# Patient Record
Sex: MALE | Race: WHITE | HISPANIC OR LATINO | Employment: UNEMPLOYED | URBAN - METROPOLITAN AREA
[De-identification: names, ages, dates, MRNs, and addresses within clinical notes are randomized per-mention and may not be internally consistent; named-entity substitution may affect disease eponyms.]

---
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
## 2021-02-03 ENCOUNTER — TELEPHONE (OUTPATIENT)
Dept: PEDIATRICS CLINIC | Facility: CLINIC | Age: 21
End: 2021-02-03

## 2021-02-05 NOTE — TELEPHONE ENCOUNTER
02/04/21 7:31 PM     Thank you for your request  Your request has been received, reviewed, and the patient chart updated  The PCP has successfully been removed with a patient attribution note  This message will now be completed      Thank you  Melissa Reese

## 2021-07-30 ENCOUNTER — OFFICE VISIT (OUTPATIENT)
Dept: INTERNAL MEDICINE CLINIC | Facility: CLINIC | Age: 21
End: 2021-07-30
Payer: COMMERCIAL

## 2021-07-30 VITALS
HEART RATE: 86 BPM | TEMPERATURE: 98.4 F | SYSTOLIC BLOOD PRESSURE: 124 MMHG | DIASTOLIC BLOOD PRESSURE: 90 MMHG | OXYGEN SATURATION: 98 % | WEIGHT: 250.6 LBS | BODY MASS INDEX: 37.98 KG/M2 | HEIGHT: 68 IN

## 2021-07-30 DIAGNOSIS — Z13.1 SCREENING FOR DIABETES MELLITUS: ICD-10-CM

## 2021-07-30 DIAGNOSIS — F34.1 DYSTHYMIA: Primary | ICD-10-CM

## 2021-07-30 DIAGNOSIS — Z13.220 SCREENING FOR LIPID DISORDERS: ICD-10-CM

## 2021-07-30 DIAGNOSIS — Z13.0 SCREENING FOR DEFICIENCY ANEMIA: ICD-10-CM

## 2021-07-30 DIAGNOSIS — F41.9 ANXIETY: ICD-10-CM

## 2021-07-30 DIAGNOSIS — Z11.59 NEED FOR HEPATITIS C SCREENING TEST: ICD-10-CM

## 2021-07-30 DIAGNOSIS — Z11.4 SCREENING FOR HIV (HUMAN IMMUNODEFICIENCY VIRUS): ICD-10-CM

## 2021-07-30 DIAGNOSIS — Z13.29 SCREENING FOR THYROID DISORDER: ICD-10-CM

## 2021-07-30 DIAGNOSIS — Z76.89 ENCOUNTER TO ESTABLISH CARE: ICD-10-CM

## 2021-07-30 PROBLEM — R14.0 BLOATING: Status: RESOLVED | Noted: 2018-04-27 | Resolved: 2021-07-30

## 2021-07-30 PROBLEM — R19.7 DIARRHEA: Status: RESOLVED | Noted: 2017-12-13 | Resolved: 2021-07-30

## 2021-07-30 PROBLEM — S62.316D CLOSED DISPLACED FRACTURE OF BASE OF FIFTH METACARPAL BONE OF RIGHT HAND WITH ROUTINE HEALING: Status: RESOLVED | Noted: 2018-09-19 | Resolved: 2021-07-30

## 2021-07-30 PROBLEM — R10.84 GENERALIZED ABDOMINAL PAIN: Status: RESOLVED | Noted: 2018-04-27 | Resolved: 2021-07-30

## 2021-07-30 PROCEDURE — 3008F BODY MASS INDEX DOCD: CPT | Performed by: NURSE PRACTITIONER

## 2021-07-30 PROCEDURE — 99214 OFFICE O/P EST MOD 30 MIN: CPT | Performed by: NURSE PRACTITIONER

## 2021-07-30 PROCEDURE — 3725F SCREEN DEPRESSION PERFORMED: CPT | Performed by: NURSE PRACTITIONER

## 2021-07-30 RX ORDER — HYDROXYZINE PAMOATE 25 MG/1
25 CAPSULE ORAL 3 TIMES DAILY PRN
Qty: 30 CAPSULE | Refills: 0 | Status: SHIPPED | OUTPATIENT
Start: 2021-07-30

## 2021-07-30 NOTE — PROGRESS NOTES
Assessment/Plan:    Problem List Items Addressed This Visit        Other    Dysthymia - Primary     Discussed options for treatment  Patient would like to start talk therapy and hold off on daily medication  Referral given for psychology  He is open to trying hydroxyzine prn for anxiety   Close follow up in 1 month for re evaluation  Discussed focusing on himself and being the best version of himself, setting goals, getting outside, staying active and exercising, healthy diet, compliance with going to work, etc    No SI or HI         Relevant Medications    hydrOXYzine pamoate (VISTARIL) 25 mg capsule    Other Relevant Orders    Ambulatory referral to Psychology    Anxiety     Start hydroxyzine prn  Follow up with talk therapy          Relevant Medications    hydrOXYzine pamoate (VISTARIL) 25 mg capsule    Other Relevant Orders    TSH, 3rd generation with Free T4 reflex      Other Visit Diagnoses     Screening for lipid disorders        Relevant Orders    Lipid Panel with Direct LDL reflex    Screening for thyroid disorder        Relevant Orders    TSH, 3rd generation with Free T4 reflex    Screening for deficiency anemia        Relevant Orders    CBC    Screening for diabetes mellitus        Relevant Orders    Comprehensive metabolic panel    Screening for HIV (human immunodeficiency virus)        Relevant Orders    HIV 1/2 Antigen/Antibody (4th Generation) w Reflex [facility]    Need for hepatitis C screening test        Relevant Orders    Hepatitis C antibody    Encounter to establish care            Depression Screening Follow-up Plan: Patient's depression screening was positive with a PHQ-2 score of 3  Their PHQ-9 score was 11  Patient assessed for underlying major depression  They have no active suicidal ideations  Brief counseling provided and recommend additional follow-up/re-evaluation next office visit  Referral given to psychology for talk therapy     M*Modal software was used to dictate this note    It may contain errors with dictating incorrect words or incorrect spelling  Please contact the provider directly with any questions  Subjective:      Patient ID: Beau Dotson is a 21 y o  male  HPI     Patient presents today to establish care with our office  He was not previously following with another PCP  He has concerns today for depression  PMH includes asthma  He is currently on albuterol prn  He has seasonal allergies for which he uses zyrtec  He is a current smoker  He uses a nicotine vape about once a week  He presents today with depression over the last month  He recently ended a relationship of 4 years  He states it was going downhill for a while and that is when he started with some depression, it worsened after the break up  Symptoms include feeling unmotivated, social anxiety  Anxiety when waking up in morning  He works in a warehouse on the weekends  He called off the last 2 weekends due to his anxiety  He lost his car in the relationship  He states he bought the car but put it in her name and now he cant have it transferred to his name because he doesn't have any credit  Felt like his life was together but now feels like it fell apart    No SI or HI  He has a good support system from his mother and also his friends/cousins       PHQ-9 Depression Screening    PHQ-9:   Frequency of the following problems over the past two weeks:      Little interest or pleasure in doing things: 2 - more than half the days  Feeling down, depressed, or hopeless: 1 - several days  Trouble falling or staying asleep, or sleeping too much: 2 - more than half the days  Feeling tired or having little energy: 1 - several days  Poor appetite or overeating: 3 - nearly every day  Feeling bad about yourself - or that you are a failure or have let yourself or your family down: 1 - several days  Trouble concentrating on things, such as reading the newspaper or watching television: 0 - not at all  Moving or speaking so slowly that other people could have noticed  Or the opposite - being so fidgety or restless that you have been moving around a lot more than usual: 1 - several days  Thoughts that you would be better off dead, or of hurting yourself in some way: 0 - not at all  PHQ-2 Score: 3  PHQ-9 Score: 11           The following portions of the patient's history were reviewed and updated as appropriate: allergies, current medications, past family history, past medical history, past social history, past surgical history and problem list     Review of Systems   Psychiatric/Behavioral: Positive for dysphoric mood  Negative for self-injury and suicidal ideas  The patient is nervous/anxious            Past Medical History:   Diagnosis Date    Asthma     Closed displaced fracture of base of fifth metacarpal bone of right hand with routine healing 9/19/2018    Otitis media     Shoulder pain     left    Sinus infection          Current Outpatient Medications:     albuterol (PROVENTIL HFA,VENTOLIN HFA) 90 mcg/act inhaler, Inhale 2 puffs every 6 (six) hours as needed for wheezing, Disp: , Rfl:     cetirizine (ZyrTEC) 10 mg tablet, Take 1 tablet (10 mg total) by mouth daily, Disp: 30 tablet, Rfl: 1    famotidine (PEPCID) 20 mg tablet, TAKE 1 TABLET BY MOUTH TWICE A DAY, Disp: 30 tablet, Rfl: 3    hydrOXYzine pamoate (VISTARIL) 25 mg capsule, Take 1 capsule (25 mg total) by mouth 3 (three) times a day as needed for anxiety, Disp: 30 capsule, Rfl: 0    No Known Allergies    Social History   Past Surgical History:   Procedure Laterality Date    CIRCUMCISION      [facility]    Procedure: ARTHROSCOPY SHOULDER , ANTERIOR LABRAL REPAIR ;  Surgeon: Eva Duffy MD;  Location: [facility] OR;  Service: Orthopedics     Family History   Problem Relation Age of Onset    Colon polyps Mother     Hypothyroidism Mother     ADD / ADHD Sister     Anemia Maternal Grandmother     Asthma Maternal Grandmother     Hypothyroidism Maternal Grandmother     Cirrhosis Maternal Grandfather     Hepatitis Maternal Grandfather     Liver disease Maternal Grandfather     Breast cancer Paternal Grandmother     Inflammatory bowel disease Maternal Aunt     ADD / ADHD Maternal Uncle     Asthma Maternal Uncle     Hypertension Maternal Uncle     No Known Problems Father        Objective:  /90 (BP Location: Left arm, Patient Position: Sitting, Cuff Size: Large)   Pulse 86   Temp 98 4 °F (36 9 °C) (Temporal)   Ht 5' 8" (1 727 m)   Wt 114 kg (250 lb 9 6 oz)   SpO2 98%   BMI 38 10 kg/m²      Physical Exam  Vitals reviewed  Constitutional:       General: He is not in acute distress  Appearance: Normal appearance  He is well-developed  He is obese  He is not diaphoretic  HENT:      Head: Normocephalic and atraumatic  Right Ear: Tympanic membrane and external ear normal       Left Ear: Tympanic membrane and external ear normal       Nose: Nose normal       Mouth/Throat:      Mouth: Mucous membranes are moist       Pharynx: Oropharynx is clear  No oropharyngeal exudate or posterior oropharyngeal erythema  Eyes:      Extraocular Movements: Extraocular movements intact  Conjunctiva/sclera: Conjunctivae normal       Pupils: Pupils are equal, round, and reactive to light  Neck:      Vascular: No carotid bruit  Cardiovascular:      Rate and Rhythm: Normal rate and regular rhythm  Heart sounds: Normal heart sounds  No murmur heard  Pulmonary:      Effort: Pulmonary effort is normal  No respiratory distress  Breath sounds: Normal breath sounds  No decreased breath sounds, wheezing, rhonchi or rales  Musculoskeletal:      Cervical back: Neck supple  Lymphadenopathy:      Cervical: No cervical adenopathy  Skin:     General: Skin is warm and dry  Neurological:      Mental Status: He is alert and oriented to person, place, and time  Mental status is at baseline  Psychiatric:         Mood and Affect: Mood normal          Behavior: Behavior normal

## 2021-07-30 NOTE — ASSESSMENT & PLAN NOTE
Discussed options for treatment  Patient would like to start talk therapy and hold off on daily medication  Referral given for psychology  He is open to trying hydroxyzine prn for anxiety   Close follow up in 1 month for re evaluation  Discussed focusing on himself and being the best version of himself, setting goals, getting outside, staying active and exercising, healthy diet, compliance with going to work, etc    No SI or HI

## 2021-08-26 ENCOUNTER — RA CDI HCC (OUTPATIENT)
Dept: OTHER | Facility: HOSPITAL | Age: 21
End: 2021-08-26

## 2021-08-26 NOTE — PROGRESS NOTES
[facility]  coding opportunities       Chart reviewed, no opportunity found: CHART REVIEWED, NO OPPORTUNITY FOUND                        Patients insurance company: [employer] (Medicare Advantage and Commercial)

## 2022-01-31 ENCOUNTER — TELEMEDICINE (OUTPATIENT)
Dept: INTERNAL MEDICINE CLINIC | Facility: OTHER | Age: 22
End: 2022-01-31
Payer: COMMERCIAL

## 2022-01-31 ENCOUNTER — TELEPHONE (OUTPATIENT)
Dept: OTHER | Facility: OTHER | Age: 22
End: 2022-01-31

## 2022-01-31 VITALS — HEIGHT: 68 IN | BODY MASS INDEX: 38.09 KG/M2 | WEIGHT: 251.32 LBS

## 2022-01-31 DIAGNOSIS — B34.9 VIRAL INFECTION: Primary | ICD-10-CM

## 2022-01-31 DIAGNOSIS — Z20.828 EXPOSURE TO SARS-ASSOCIATED CORONAVIRUS: Primary | ICD-10-CM

## 2022-01-31 PROCEDURE — U0003 INFECTIOUS AGENT DETECTION BY NUCLEIC ACID (DNA OR RNA); SEVERE ACUTE RESPIRATORY SYNDROME CORONAVIRUS 2 (SARS-COV-2) (CORONAVIRUS DISEASE [COVID-19]), AMPLIFIED PROBE TECHNIQUE, MAKING USE OF HIGH THROUGHPUT TECHNOLOGIES AS DESCRIBED BY CMS-2020-01-R: HCPCS | Performed by: NURSE PRACTITIONER

## 2022-01-31 PROCEDURE — 99213 OFFICE O/P EST LOW 20 MIN: CPT | Performed by: NURSE PRACTITIONER

## 2022-01-31 PROCEDURE — U0005 INFEC AGEN DETEC AMPLI PROBE: HCPCS | Performed by: NURSE PRACTITIONER

## 2022-01-31 NOTE — TELEPHONE ENCOUNTER
Pt calling because he was seen via telemedicine today for Covid symptoms  Provider said she would place the order however, he is at the testing site, and there is not an order in the system  Reviewed provider's notes and placed Covid order  Pt also said provider told him that she would place a work note into his AVS that he can view in [address]  Pt says that he can not see it on MyChart  Informed him that I will let office staff know

## 2022-01-31 NOTE — PATIENT INSTRUCTIONS
Quarantine x 5 days from symptom onset  Take tylenol or ibuprofen as needed for body aches/headaches  Use mucinex bid for congestion/cough  Drink plenty of fluids  Obtain flu/covid testing

## 2022-01-31 NOTE — LETTER
[facility] [address] [address]  Dept: [phone]    January 31, 2022    Patient: Tsering Hernandes  YOB: 2000    Tsering Hernandes was seen and evaluated at our [facility]  Please note if Covid and Flu tests are negative, they may return to work when fever free for 24 hours without the use of a fever reducing agent  If Covid or Flu test is positive, they may return to work on 2/2/2022, as this is 5 days from the onset of symptoms  Upon return, they must then adhere to strict masking for an additional 5 days      Sincerely,    Melinda Moreno

## 2022-01-31 NOTE — PROGRESS NOTES
COVID-19 Outpatient Progress Note    Assessment/Plan:    Problem List Items Addressed This Visit        Other    Viral infection - Primary         Disposition:     Referred patient to centralized site to test for COVID-19/Influenza  Patient is fully vaccinated and I recommended self quarantine for 5 days followed by strict mask use for an additional 5 days  If patient were to develop symptoms, they should immediately self isolate and call our office for further guidance  Discussed symptom directed medication options with patient  Drink plenty of fluids  Take tylenol or ibuprofen as needed for body aches  Quarantine x 5 days from onset of symptoms then mask x 5 days  Obtain covid/flu test  Take mucinex as needed for cough  Take flonase for nasal congestion    I have spent 20 minutes directly with the patient  Greater than 50% of this time was spent in counseling/coordination of care regarding: risks and benefits of treatment options, instructions for management, patient and family education and importance of treatment compliance  Encounter provider FREDDY Knutson    Provider located at [address] [place] [address]    Recent Visits  No visits were found meeting these conditions  Showing recent visits within past 7 days and meeting all other requirements  Today's Visits  Date Type Provider Dept   01/31/22 Telemedicine FREDDY Knutson [facility]   Showing today's visits and meeting all other requirements  Future Appointments  No visits were found meeting these conditions  Showing future appointments within next 150 days and meeting all other requirements     This virtual check-in was done via [employer] and patient was informed that this is a secure, HIPAA-compliant platform  He agrees to proceed      Patient agrees to participate in a virtual check in via telephone or video visit instead of presenting to the office to address urgent/immediate medical needs  Patient is aware this is a billable service  After connecting through [facility], the patient was identified by name and date of birth  Adria Kline was informed that this was a telemedicine visit and that the exam was being conducted confidentially over secure lines  My office door was closed  No one else was in the room  82 Isabel Kline acknowledged consent and understanding of privacy and security of the telemedicine visit  I informed the patient that I have reviewed his record in Epic and presented the opportunity for him to ask any questions regarding the visit today  The patient agreed to participate  Verification of patient location:  Patient is located in the following state in which I hold an active license: [place]    Subjective:   Adria Kline is a 24 y o  male who is concerned about COVID-19  Patient's symptoms include nasal congestion, sore throat, anosmia, loss of taste and cough  - Date of symptom onset: 1/28/2022      COVID-19 vaccination status: Fully vaccinated (primary series)    Exposure:   Contact with a person who is under investigation (PUI) for or who is positive for COVID-19 within the last 14 days?: No    Hospitalized recently for fever and/or lower respiratory symptoms?: No      Currently a healthcare worker that is involved in direct patient care?: No      Works in a special setting where the risk of COVID-19 transmission may be high? (this may include long-term care, correctional and group home facilities; homeless shelters; assisted-living facilities and group homes ): Yes      Resident in a special setting where the risk of COVID-19 transmission may be high? (this may include long-term care, correctional and group home facilities; homeless shelters; assisted-living facilities and group homes ): No      Pt works at [employer]   He started to develop vague symptoms on Friday of nasal congestion, headache, cough  He denies any fever or chills, he does have loss of taste/smell this a m  He is requesting testing due to his occupation  I did tell him to quarantine x 5 days from onset of symptoms as well as make sure he masks 5 days thereafter  He will obtain testing, He can also take mucinex bid as needed for cough; tylenol and ibuprofen as needed for generalized body aches  Lab Results   Component Value Date    [address] [facility] [address] [facility] Not Detected 12/13/2021     Past Medical History:   Diagnosis Date    Asthma     Closed displaced fracture of base of fifth metacarpal bone of right hand with routine healing 9/19/2018    Otitis media     Shoulder pain     left    Sinus infection      Past Surgical History:   Procedure Laterality Date    CIRCUMCISION      [facility] ARTHROSCOP,SURG,CAPSULORRHAPHY Left 5/9/2016    Procedure: ARTHROSCOPY SHOULDER , ANTERIOR LABRAL REPAIR ;  Surgeon: Сергей Rubio MD;  Location: [facility] OR;  Service: Orthopedics     Current Outpatient Medications   Medication Sig Dispense Refill    albuterol (PROVENTIL HFA,VENTOLIN HFA) 90 mcg/act inhaler Inhale 2 puffs every 6 (six) hours as needed for wheezing      cetirizine (ZyrTEC) 10 mg tablet Take 1 tablet (10 mg total) by mouth daily 30 tablet 1    famotidine (PEPCID) 20 mg tablet TAKE 1 TABLET BY MOUTH TWICE A DAY 30 tablet 3    hydrOXYzine pamoate (VISTARIL) 25 mg capsule Take 1 capsule (25 mg total) by mouth 3 (three) times a day as needed for anxiety 30 capsule 0     No current facility-administered medications for this visit  No Known Allergies    Review of Systems   HENT: Positive for congestion and sore throat  Respiratory: Positive for cough  Objective:    Vitals:    01/31/22 1153   Weight: 114 kg (251 lb 5 2 oz)   Height: 5' 8" (1 727 m)       Physical Exam  Vitals reviewed  Constitutional:       Appearance: Normal appearance  He is obese     Neurological: Mental Status: He is alert  VIRTUAL VISIT Katie Duran verbally agrees to participate in [employer]  Pt is aware that [employer] could be limited without vital signs or the ability to perform a full hands-on physical exam  Jovi Flowers understands he or the provider may request at any time to terminate the video visit and request the patient to seek care or treatment in person

## 2022-03-30 ENCOUNTER — OFFICE VISIT (OUTPATIENT)
Dept: INTERNAL MEDICINE CLINIC | Facility: OTHER | Age: 22
End: 2022-03-30
Payer: COMMERCIAL

## 2022-03-30 VITALS
HEIGHT: 68 IN | SYSTOLIC BLOOD PRESSURE: 136 MMHG | TEMPERATURE: 98 F | OXYGEN SATURATION: 99 % | DIASTOLIC BLOOD PRESSURE: 80 MMHG | WEIGHT: 256 LBS | BODY MASS INDEX: 38.8 KG/M2 | HEART RATE: 86 BPM

## 2022-03-30 DIAGNOSIS — J45.909 UNCOMPLICATED ASTHMA, UNSPECIFIED ASTHMA SEVERITY, UNSPECIFIED WHETHER PERSISTENT: ICD-10-CM

## 2022-03-30 DIAGNOSIS — J11.1 INFLUENZA: Primary | ICD-10-CM

## 2022-03-30 PROCEDURE — 99213 OFFICE O/P EST LOW 20 MIN: CPT | Performed by: NURSE PRACTITIONER

## 2022-03-30 PROCEDURE — 3008F BODY MASS INDEX DOCD: CPT | Performed by: NURSE PRACTITIONER

## 2022-03-30 PROCEDURE — 3725F SCREEN DEPRESSION PERFORMED: CPT | Performed by: NURSE PRACTITIONER

## 2022-03-30 RX ORDER — OSELTAMIVIR PHOSPHATE 75 MG/1
75 CAPSULE ORAL EVERY 12 HOURS SCHEDULED
Qty: 10 CAPSULE | Refills: 0 | Status: SHIPPED | OUTPATIENT
Start: 2022-03-30 | End: 2022-04-04

## 2022-03-30 NOTE — ASSESSMENT & PLAN NOTE
Due to known exposure to influenza will start Tamiflu advised patient to be out of work for 72 hours and not return if he develops a fever  Patient would like to defer COVID testing at this time  Illness appears to be viral in nature  Rest and fluids advised  Educated that the course of this illness could be 2-4 weeks  Discussed symptomatic relief, such as warm steam inhalations, tylenol/ibuprofen for fevers and body aches, rest, and drink plenty of fluids  Warm salt gargles for sore throat  Discussed red flag signs to go to the ER, such as chest pain or shortness of breath     Return to the office for reevaluation if symptoms worsen or do not improve in 1-2 weeks

## 2022-03-30 NOTE — LETTER
March 30, 2022     Patient: Waldemar Mcduffie   YOB: 2000   Date of Visit: 3/30/2022       To Whom it May Concern:    Ernst Dasilva is under my professional care  He was seen in my office on 3/30/2022  He may return to work on 4/1/22  If you have any questions or concerns, please don't hesitate to call  Sincerely,          [address], [place]        CC: Jovi Brown Erp

## 2022-03-30 NOTE — PROGRESS NOTES
Assessment/Plan:    Influenza  Due to known exposure to influenza will start Tamiflu advised patient to be out of work for 72 hours and not return if he develops a fever  Patient would like to defer COVID testing at this time  Illness appears to be viral in nature  Rest and fluids advised  Educated that the course of this illness could be 2-4 weeks  Discussed symptomatic relief, such as warm steam inhalations, tylenol/ibuprofen for fevers and body aches, rest, and drink plenty of fluids  Warm salt gargles for sore throat  Discussed red flag signs to go to the ER, such as chest pain or shortness of breath  Return to the office for reevaluation if symptoms worsen or do not improve in 1-2 weeks          Diagnoses and all orders for this visit:    Influenza  -     oseltamivir (TAMIFLU) 75 mg capsule; Take 1 capsule (75 mg total) by mouth every 12 (twelve) hours for 5 days    Uncomplicated asthma, unspecified asthma severity, unspecified whether persistent          Subjective:      Patient ID: Tsering Hernandes is a 24 y o  male  Patient presents today with URI like symptoms  Reports that his mom who he lives with was diagnosed with the flu, no known exposure to [place]  Patient reports he is vaccinated for both influenza a and COVID-19  Sore Throat   This is a new problem  The current episode started yesterday  The problem has been gradually worsening  Neither side of throat is experiencing more pain than the other  There has been no fever  The pain is at a severity of 6/10  Associated symptoms include congestion and trouble swallowing  Pertinent negatives include no abdominal pain, coughing, diarrhea, ear discharge, ear pain, headaches, neck pain, shortness of breath or vomiting  Treatments tried: throat spray  The treatment provided mild relief         The following portions of the patient's history were reviewed and updated as appropriate: allergies, current medications, past family history, past medical history, past social history, past surgical history and problem list     Review of Systems   Constitutional: Negative for activity change, appetite change, chills, diaphoresis and fever  HENT: Positive for congestion, sore throat and trouble swallowing  Negative for ear discharge, ear pain, postnasal drip, rhinorrhea, sinus pressure and sinus pain  Eyes: Negative for pain, discharge, itching and visual disturbance  Respiratory: Negative for cough, chest tightness, shortness of breath and wheezing  Cardiovascular: Negative for chest pain, palpitations and leg swelling  Gastrointestinal: Negative for abdominal pain, constipation, diarrhea, nausea and vomiting  Endocrine: Negative for polydipsia, polyphagia and polyuria  Genitourinary: Negative for difficulty urinating, dysuria and urgency  Musculoskeletal: Negative for arthralgias, back pain and neck pain  Skin: Negative for rash and wound  Neurological: Negative for dizziness, weakness, numbness and headaches  Objective:      /80 (BP Location: Left arm, Patient Position: Sitting, Cuff Size: Large)   Pulse 86   Temp 98 °F (36 7 °C)   Ht 5' 8" (1 727 m)   Wt 116 kg (256 lb)   SpO2 99%   BMI 38 92 kg/m²          Physical Exam  Constitutional:       General: He is not in acute distress  Appearance: He is well-developed  He is not diaphoretic  HENT:      Head: Normocephalic and atraumatic  Right Ear: External ear normal       Left Ear: External ear normal       Nose: Nose normal       Mouth/Throat:      Pharynx: No oropharyngeal exudate  Eyes:      General:         Right eye: No discharge  Left eye: No discharge  Conjunctiva/sclera: Conjunctivae normal       Pupils: Pupils are equal, round, and reactive to light  Neck:      Thyroid: No thyromegaly  Cardiovascular:      Rate and Rhythm: Normal rate and regular rhythm  Heart sounds: Normal heart sounds  No murmur heard    No friction rub  No gallop  Pulmonary:      Effort: Pulmonary effort is normal  No respiratory distress  Breath sounds: Normal breath sounds  No stridor  No wheezing or rales  Abdominal:      General: Bowel sounds are normal  There is no distension  Palpations: Abdomen is soft  Tenderness: There is no abdominal tenderness  Musculoskeletal:      Cervical back: Normal range of motion and neck supple  Lymphadenopathy:      Head:      Right side of head: Submandibular adenopathy present  Left side of head: Submandibular adenopathy present  Cervical: No cervical adenopathy  Skin:     General: Skin is warm and dry  Findings: No erythema or rash  Neurological:      Mental Status: He is alert and oriented to person, place, and time  Psychiatric:         Behavior: Behavior normal          Thought Content:  Thought content normal          Judgment: Judgment normal

## 2022-10-11 PROBLEM — J11.1 INFLUENZA: Status: RESOLVED | Noted: 2022-03-30 | Resolved: 2022-10-11

## 2023-05-02 ENCOUNTER — TELEMEDICINE (OUTPATIENT)
Dept: INTERNAL MEDICINE CLINIC | Facility: CLINIC | Age: 23
End: 2023-05-02

## 2023-05-02 VITALS — BODY MASS INDEX: 36.37 KG/M2 | WEIGHT: 240 LBS | HEIGHT: 68 IN

## 2023-05-02 DIAGNOSIS — R11.2 NAUSEA AND VOMITING, UNSPECIFIED VOMITING TYPE: Primary | ICD-10-CM

## 2023-05-02 DIAGNOSIS — K21.9 GASTROESOPHAGEAL REFLUX DISEASE, UNSPECIFIED WHETHER ESOPHAGITIS PRESENT: ICD-10-CM

## 2023-05-02 RX ORDER — PANTOPRAZOLE SODIUM 40 MG/1
40 TABLET, DELAYED RELEASE ORAL DAILY
Qty: 14 TABLET | Refills: 0 | Status: SHIPPED | OUTPATIENT
Start: 2023-05-02

## 2023-05-02 RX ORDER — ONDANSETRON 4 MG/1
4 TABLET, FILM COATED ORAL EVERY 8 HOURS PRN
Qty: 20 TABLET | Refills: 0 | Status: SHIPPED | OUTPATIENT
Start: 2023-05-02

## 2023-05-02 NOTE — PROGRESS NOTES
Virtual Regular Visit    Verification of patient location:    Patient is located at Home in the following state in which I hold an active license [place]      Assessment/Plan:    Problem List Items Addressed This Visit    None  Visit Diagnoses     Nausea and vomiting, unspecified vomiting type    -  Primary    Relevant Medications    ondansetron (ZOFRAN) 4 mg tablet    Gastroesophageal reflux disease, unspecified whether esophagitis present        Relevant Medications    pantoprazole (PROTONIX) 40 mg tablet        Unclear etiology of the given symptoms, will give symptomatic treatment, expect symptoms to self resolve, if not recommend patient to follow-up           Reason for visit is   Chief Complaint   Patient presents with    Virtual Regular Visit     throwing up, irritated throat and upset stomach        Encounter provider Ange Cassidy MD    Provider located at [address] [facility] [address] [phone]      Recent Visits  No visits were found meeting these conditions  Showing recent visits within past 7 days and meeting all other requirements  Today's Visits  Date Type Provider Dept   05/02/23 Telemedicine Ange Cassidy MD [facility]  [address] Internal [facility]   Showing today's visits and meeting all other requirements  Future Appointments  No visits were found meeting these conditions  Showing future appointments within next 150 days and meeting all other requirements       The patient was identified by name and date of birth  [birth year] [address] Guillen Raisa was informed that this is a telemedicine visit and that the visit is being conducted through the [employer]  He agrees to proceed     My office door was closed  No one else was in the room  He acknowledged consent and understanding of privacy and security of the video platform   The patient has agreed to participate and understands they can discontinue the visit at any time     Patient is aware this is a billable service  Subjective  Jovi Soriano is a 25 y o  male    Complains of upper abdominal pain, nausea and few episodes of vomiting since last night after eating a burger for lunch  Denies any fever chills shortness of breath diarrhea or chest pain       Past Medical History:   Diagnosis Date    Asthma     Closed displaced fracture of base of fifth metacarpal bone of right hand with routine healing 9/19/2018    Otitis media     Shoulder pain     left    Sinus infection        Past Surgical History:   Procedure Laterality Date    CIRCUMCISION      [place] SURGICAL ARTHROSCOPY SHOULDER CAPSULORRHAPHY Left 5/9/2016    Procedure: ARTHROSCOPY SHOULDER , ANTERIOR LABRAL REPAIR ;  Surgeon: Katherine Mi MD;  Location: [facility];  Service: Orthopedics       Current Outpatient Medications   Medication Sig Dispense Refill    albuterol (PROVENTIL HFA,VENTOLIN HFA) 90 mcg/act inhaler Inhale 2 puffs every 6 (six) hours as needed for wheezing      cetirizine (ZyrTEC) 10 mg tablet Take 1 tablet (10 mg total) by mouth daily 30 tablet 1    famotidine (PEPCID) 20 mg tablet TAKE 1 TABLET BY MOUTH TWICE A DAY 30 tablet 3    hydrOXYzine pamoate (VISTARIL) 25 mg capsule Take 1 capsule (25 mg total) by mouth 3 (three) times a day as needed for anxiety 30 capsule 0    ondansetron (ZOFRAN) 4 mg tablet Take 1 tablet (4 mg total) by mouth every 8 (eight) hours as needed for nausea or vomiting 20 tablet 0    pantoprazole (PROTONIX) 40 mg tablet Take 1 tablet (40 mg total) by mouth daily 14 tablet 0     No current facility-administered medications for this visit  No Known Allergies    Review of Systems   Constitutional: Negative for activity change, appetite change, chills, diaphoresis, fatigue, fever and unexpected weight change  HENT: Positive for congestion  Negative for sore throat      Respiratory: Negative for apnea, cough, choking, chest tightness, shortness of "breath, wheezing and stridor  Cardiovascular: Negative for chest pain, palpitations and leg swelling  Gastrointestinal: Positive for abdominal pain, nausea and vomiting  Negative for abdominal distention, blood in stool, constipation and rectal pain  Genitourinary: Negative for dysuria, flank pain, frequency and urgency  Musculoskeletal: Negative for arthralgias, back pain, gait problem, joint swelling and myalgias  Skin: Negative for color change, pallor and rash  Neurological: Negative for headaches  Video Exam    Vitals:    05/02/23 1413   Weight: 109 kg (240 lb)   Height: 5' 8\" (1 727 m)       Physical Exam  Constitutional:       General: He is not in acute distress  Appearance: Normal appearance  He is obese  He is not ill-appearing, toxic-appearing or diaphoretic  Neurological:      Mental Status: He is alert and oriented to person, place, and time            Visit Time  Total Visit Duration: 15      "

## 2023-05-30 ENCOUNTER — RA CDI HCC (OUTPATIENT)
Dept: OTHER | Facility: HOSPITAL | Age: 23
End: 2023-05-30

## 2023-05-30 NOTE — PROGRESS NOTES
[id number]    NOT on the BPA- please assess using MEAT for 2023 billing    [facility] [address]  coding opportunities          Chart Reviewed number of suggestions sent to Provider: 1     Patients Insurance        Commercial Insurance: [employer]

## 2023-06-07 ENCOUNTER — APPOINTMENT (OUTPATIENT)
Dept: LAB | Age: 23
End: 2023-06-07
Payer: COMMERCIAL

## 2023-06-07 ENCOUNTER — OFFICE VISIT (OUTPATIENT)
Dept: INTERNAL MEDICINE CLINIC | Facility: OTHER | Age: 23
End: 2023-06-07
Payer: COMMERCIAL

## 2023-06-07 VITALS
WEIGHT: 232 LBS | BODY MASS INDEX: 35.16 KG/M2 | DIASTOLIC BLOOD PRESSURE: 70 MMHG | TEMPERATURE: 98.6 F | OXYGEN SATURATION: 97 % | HEIGHT: 68 IN | HEART RATE: 82 BPM | SYSTOLIC BLOOD PRESSURE: 122 MMHG

## 2023-06-07 DIAGNOSIS — Z13.228 SCREENING FOR METABOLIC DISORDER: ICD-10-CM

## 2023-06-07 DIAGNOSIS — N48.9 LESION OF PENIS: Primary | ICD-10-CM

## 2023-06-07 DIAGNOSIS — N48.9 LESION OF PENIS: ICD-10-CM

## 2023-06-07 LAB
ALBUMIN SERPL BCP-MCNC: 4 G/DL (ref 3.5–5)
ALP SERPL-CCNC: 101 U/L (ref 46–116)
ALT SERPL W P-5'-P-CCNC: 55 U/L (ref 12–78)
AMORPH URATE CRY URNS QL MICRO: ABNORMAL
ANION GAP SERPL CALCULATED.3IONS-SCNC: 4 MMOL/L (ref 4–13)
AST SERPL W P-5'-P-CCNC: 24 U/L (ref 5–45)
BACTERIA UR QL AUTO: ABNORMAL /HPF
BASOPHILS # BLD AUTO: 0.07 THOUSANDS/ÂΜL (ref 0–0.1)
BASOPHILS NFR BLD AUTO: 1 % (ref 0–1)
BILIRUB SERPL-MCNC: 0.23 MG/DL (ref 0.2–1)
BILIRUB UR QL STRIP: NEGATIVE
BUN SERPL-MCNC: 15 MG/DL (ref 5–25)
CALCIUM SERPL-MCNC: 8.8 MG/DL (ref 8.3–10.1)
CHLORIDE SERPL-SCNC: 104 MMOL/L (ref 96–108)
CHOLEST SERPL-MCNC: 174 MG/DL
CLARITY UR: CLEAR
CO2 SERPL-SCNC: 27 MMOL/L (ref 21–32)
COLOR UR: YELLOW
CREAT SERPL-MCNC: 1.1 MG/DL (ref 0.6–1.3)
EOSINOPHIL # BLD AUTO: 0.05 THOUSAND/ÂΜL (ref 0–0.61)
EOSINOPHIL NFR BLD AUTO: 1 % (ref 0–6)
ERYTHROCYTE [DISTWIDTH] IN BLOOD BY AUTOMATED COUNT: 12.5 % (ref 11.6–15.1)
EST. AVERAGE GLUCOSE BLD GHB EST-MCNC: 105 MG/DL
GFR SERPL CREATININE-BSD FRML MDRD: 94 ML/MIN/1.73SQ M
GLUCOSE SERPL-MCNC: 87 MG/DL (ref 65–140)
GLUCOSE UR STRIP-MCNC: NEGATIVE MG/DL
HBA1C MFR BLD: 5.3 %
HCT VFR BLD AUTO: 47.3 % (ref 36.5–49.3)
HDLC SERPL-MCNC: 41 MG/DL
HGB BLD-MCNC: 15.3 G/DL (ref 12–17)
HGB UR QL STRIP.AUTO: NEGATIVE
IMM GRANULOCYTES # BLD AUTO: 0.08 THOUSAND/UL (ref 0–0.2)
IMM GRANULOCYTES NFR BLD AUTO: 1 % (ref 0–2)
KETONES UR STRIP-MCNC: NEGATIVE MG/DL
LDLC SERPL CALC-MCNC: 72 MG/DL (ref 0–100)
LEUKOCYTE ESTERASE UR QL STRIP: NEGATIVE
LYMPHOCYTES # BLD AUTO: 2.05 THOUSANDS/ÂΜL (ref 0.6–4.47)
LYMPHOCYTES NFR BLD AUTO: 20 % (ref 14–44)
MCH RBC QN AUTO: 28.4 PG (ref 26.8–34.3)
MCHC RBC AUTO-ENTMCNC: 32.3 G/DL (ref 31.4–37.4)
MCV RBC AUTO: 88 FL (ref 82–98)
MONOCYTES # BLD AUTO: 0.83 THOUSAND/ÂΜL (ref 0.17–1.22)
MONOCYTES NFR BLD AUTO: 8 % (ref 4–12)
MUCOUS THREADS UR QL AUTO: ABNORMAL
NEUTROPHILS # BLD AUTO: 7.39 THOUSANDS/ÂΜL (ref 1.85–7.62)
NEUTS SEG NFR BLD AUTO: 69 % (ref 43–75)
NITRITE UR QL STRIP: NEGATIVE
NON-SQ EPI CELLS URNS QL MICRO: ABNORMAL /HPF
NONHDLC SERPL-MCNC: 133 MG/DL
NRBC BLD AUTO-RTO: 0 /100 WBCS
PH UR STRIP.AUTO: 7 [PH]
PLATELET # BLD AUTO: 332 THOUSANDS/UL (ref 149–390)
PMV BLD AUTO: 11 FL (ref 8.9–12.7)
POTASSIUM SERPL-SCNC: 3.4 MMOL/L (ref 3.5–5.3)
PROT SERPL-MCNC: 8.2 G/DL (ref 6.4–8.4)
PROT UR STRIP-MCNC: ABNORMAL MG/DL
RBC # BLD AUTO: 5.38 MILLION/UL (ref 3.88–5.62)
RBC #/AREA URNS AUTO: ABNORMAL /HPF
SODIUM SERPL-SCNC: 135 MMOL/L (ref 135–147)
SP GR UR STRIP.AUTO: 1.03 (ref 1–1.03)
TRIGL SERPL-MCNC: 303 MG/DL
UROBILINOGEN UR STRIP-ACNC: 2 MG/DL
WBC # BLD AUTO: 10.47 THOUSAND/UL (ref 4.31–10.16)
WBC #/AREA URNS AUTO: ABNORMAL /HPF

## 2023-06-07 PROCEDURE — 86696 HERPES SIMPLEX TYPE 2 TEST: CPT | Performed by: NURSE PRACTITIONER

## 2023-06-07 PROCEDURE — 87491 CHLMYD TRACH DNA AMP PROBE: CPT | Performed by: NURSE PRACTITIONER

## 2023-06-07 PROCEDURE — 80053 COMPREHEN METABOLIC PANEL: CPT

## 2023-06-07 PROCEDURE — 81001 URINALYSIS AUTO W/SCOPE: CPT | Performed by: NURSE PRACTITIONER

## 2023-06-07 PROCEDURE — 99213 OFFICE O/P EST LOW 20 MIN: CPT | Performed by: NURSE PRACTITIONER

## 2023-06-07 PROCEDURE — 36415 COLL VENOUS BLD VENIPUNCTURE: CPT

## 2023-06-07 PROCEDURE — 85025 COMPLETE CBC W/AUTO DIFF WBC: CPT | Performed by: NURSE PRACTITIONER

## 2023-06-07 PROCEDURE — 80061 LIPID PANEL: CPT | Performed by: NURSE PRACTITIONER

## 2023-06-07 PROCEDURE — 87563 M. GENITALIUM AMP PROBE: CPT

## 2023-06-07 PROCEDURE — 86695 HERPES SIMPLEX TYPE 1 TEST: CPT | Performed by: NURSE PRACTITIONER

## 2023-06-07 PROCEDURE — 87661 TRICHOMONAS VAGINALIS AMPLIF: CPT

## 2023-06-07 PROCEDURE — 83036 HEMOGLOBIN GLYCOSYLATED A1C: CPT | Performed by: NURSE PRACTITIONER

## 2023-06-07 PROCEDURE — 87591 N.GONORRHOEAE DNA AMP PROB: CPT | Performed by: NURSE PRACTITIONER

## 2023-06-07 NOTE — PROGRESS NOTES
Assessment/Plan:    Lesion of penis  Will get STD testing  Recommended abstaining from sexual intercourse  Referral to urology if needed  Advised to use warm compresses      BMI Counseling: Body mass index is 35 28 kg/m²  The BMI is above normal  Nutrition recommendations include decreasing portion sizes, encouraging healthy choices of fruits and vegetables, decreasing fast food intake, consuming healthier snacks, limiting drinks that contain sugar, moderation in carbohydrate intake, increasing intake of lean protein, reducing intake of saturated and trans fat and reducing intake of cholesterol  Exercise recommendations include moderate physical activity 150 minutes/week and exercising 3-5 times per week  Rationale for BMI follow-up plan is due to patient being overweight or obese  Diagnoses and all orders for this visit:    Lesion of penis  -     Herpes I/II IgG Antibodies  -     Chlamydia/GC amplified DNA by PCR  -     Trichomonas vaginalis/Mycoplasma genitalium PCR; Future  -     UA w Reflex to Microscopic w Reflex to Culture    Screening for metabolic disorder  -     Hemoglobin A1C  -     Comprehensive metabolic panel; Future  -     CBC and differential  -     Lipid panel          Subjective:      Patient ID: Zahida Dillard is a 25 y o  male  Patient presented today with concerns for a lesion to his penis  Started two weeks ago  Denies drainage, denies pain or irriation     Sexually active, wears condoms, no new partner    Denies cloudy urine       The following portions of the patient's history were reviewed and updated as appropriate: allergies, current medications, past family history, past medical history, past social history, past surgical history and problem list     Review of Systems   Constitutional: Negative for activity change, appetite change, chills, diaphoresis and fever     HENT: Negative for congestion, ear discharge, ear pain, postnasal drip, rhinorrhea, sinus pressure, sinus pain and sore throat  Eyes: Negative for pain, discharge, itching and visual disturbance  Respiratory: Negative for cough, chest tightness, shortness of breath and wheezing  Cardiovascular: Negative for chest pain, palpitations and leg swelling  Gastrointestinal: Negative for abdominal pain, constipation, diarrhea, nausea and vomiting  Endocrine: Negative for polydipsia, polyphagia and polyuria  Genitourinary: Positive for genital sores  Negative for difficulty urinating, dysuria, penile discharge, penile swelling, testicular pain and urgency  Musculoskeletal: Negative for arthralgias, back pain and neck pain  Skin: Negative for rash and wound  Neurological: Negative for dizziness, weakness, numbness and headaches           Past Medical History:   Diagnosis Date   • Asthma    • Closed displaced fracture of base of fifth metacarpal bone of right hand with routine healing 9/19/2018   • Otitis media    • Shoulder pain     left   • Sinus infection          Current Outpatient Medications:   •  albuterol (PROVENTIL HFA,VENTOLIN HFA) 90 mcg/act inhaler, Inhale 2 puffs every 6 (six) hours as needed for wheezing, Disp: , Rfl:   •  cetirizine (ZyrTEC) 10 mg tablet, Take 1 tablet (10 mg total) by mouth daily, Disp: 30 tablet, Rfl: 1  •  famotidine (PEPCID) 20 mg tablet, TAKE 1 TABLET BY MOUTH TWICE A DAY, Disp: 30 tablet, Rfl: 3  •  hydrOXYzine pamoate (VISTARIL) 25 mg capsule, Take 1 capsule (25 mg total) by mouth 3 (three) times a day as needed for anxiety, Disp: 30 capsule, Rfl: 0  •  ondansetron (ZOFRAN) 4 mg tablet, Take 1 tablet (4 mg total) by mouth every 8 (eight) hours as needed for nausea or vomiting, Disp: 20 tablet, Rfl: 0  •  pantoprazole (PROTONIX) 40 mg tablet, Take 1 tablet (40 mg total) by mouth daily, Disp: 14 tablet, Rfl: 0    No Known Allergies    Social History   Past Surgical History:   Procedure Laterality Date   • CIRCUMCISION     • [place] SURGICAL ARTHROSCOPY SHOULDER "CAPSULORRHAPHY Left 5/9/2016    Procedure: ARTHROSCOPY SHOULDER , ANTERIOR LABRAL REPAIR ;  Surgeon: Jorge Quiroga MD;  Location: [facility];  Service: Orthopedics     Family History   Problem Relation Age of Onset   • Colon polyps Mother    • Hypothyroidism Mother    • ADD / ADHD Sister    • Anemia Maternal Grandmother    • Asthma Maternal Grandmother    • Hypothyroidism Maternal Grandmother    • Cirrhosis Maternal Grandfather    • Hepatitis Maternal Grandfather    • Liver disease Maternal Grandfather    • Breast cancer Paternal Grandmother    • Inflammatory bowel disease Maternal Aunt    • ADD / ADHD Maternal Uncle    • Asthma Maternal Uncle    • Hypertension Maternal Uncle    • No Known Problems Father        Objective:  /70 (BP Location: Left arm, Patient Position: Sitting, Cuff Size: Large)   Pulse 82   Temp 98 6 °F (37 °C) (Temporal)   Ht 5' 8\" (1 727 m)   Wt 105 kg (232 lb)   SpO2 97%   BMI 35 28 kg/m²     No results found for this or any previous visit (from the past 1344 hour(s))  Physical Exam  Constitutional:       General: He is not in acute distress  Appearance: He is well-developed  He is not diaphoretic  HENT:      Head: Normocephalic and atraumatic  Right Ear: External ear normal       Left Ear: External ear normal       Nose: Nose normal       Mouth/Throat:      Pharynx: No oropharyngeal exudate  Eyes:      General:         Right eye: No discharge  Left eye: No discharge  Conjunctiva/sclera: Conjunctivae normal       Pupils: Pupils are equal, round, and reactive to light  Neck:      Thyroid: No thyromegaly  Cardiovascular:      Rate and Rhythm: Normal rate and regular rhythm  Heart sounds: Normal heart sounds  No murmur heard  No friction rub  No gallop  Pulmonary:      Effort: Pulmonary effort is normal  No respiratory distress  Breath sounds: Normal breath sounds  No stridor  No wheezing or rales     Abdominal:      General: Bowel " sounds are normal  There is no distension  Palpations: Abdomen is soft  Tenderness: There is no abdominal tenderness  Genitourinary:      Musculoskeletal:      Cervical back: Normal range of motion and neck supple  Lymphadenopathy:      Cervical: No cervical adenopathy  Skin:     General: Skin is warm and dry  Findings: No erythema or rash  Neurological:      Mental Status: He is alert and oriented to person, place, and time  Psychiatric:         Behavior: Behavior normal          Thought Content:  Thought content normal          Judgment: Judgment normal

## 2023-06-07 NOTE — ASSESSMENT & PLAN NOTE
Will get STD testing  Recommended abstaining from sexual intercourse  Referral to urology if needed     Advised to use warm compresses

## 2023-06-08 LAB
C TRACH DNA SPEC QL NAA+PROBE: NEGATIVE
N GONORRHOEA DNA SPEC QL NAA+PROBE: NEGATIVE

## 2023-06-09 LAB
HSV1 IGG SER IA-ACNC: <0.91 INDEX (ref 0–0.9)
HSV2 IGG SER IA-ACNC: <0.91 INDEX (ref 0–0.9)
M GENITALIUM DNA SPEC QL NAA+PROBE: NEGATIVE
T VAGINALIS DNA SPEC QL NAA+PROBE: NEGATIVE

## 2023-06-28 ENCOUNTER — OFFICE VISIT (OUTPATIENT)
Dept: INTERNAL MEDICINE CLINIC | Facility: OTHER | Age: 23
End: 2023-06-28
Payer: COMMERCIAL

## 2023-06-28 VITALS
OXYGEN SATURATION: 97 % | WEIGHT: 240 LBS | HEART RATE: 99 BPM | HEIGHT: 68 IN | SYSTOLIC BLOOD PRESSURE: 128 MMHG | BODY MASS INDEX: 36.37 KG/M2 | DIASTOLIC BLOOD PRESSURE: 82 MMHG | TEMPERATURE: 97.3 F

## 2023-06-28 DIAGNOSIS — R80.8 OTHER PROTEINURIA: ICD-10-CM

## 2023-06-28 DIAGNOSIS — N48.9 LESION OF PENIS: Primary | ICD-10-CM

## 2023-06-28 DIAGNOSIS — E78.1 HYPERTRIGLYCERIDEMIA: ICD-10-CM

## 2023-06-28 NOTE — PROGRESS NOTES
Assessment/Plan:    Hypertriglyceridemia  Advised to start fishoil  Recommend healthy lifestyle choices for your cholesterol  Low fat/low cholesterol diet  Limit/avoid red meat  Eat more lean meat - chicken breast, ground turkey, fish  Exercise 30 mins at least 5 times a week as tolerated  Other proteinuria  Continue to monitor  Diagnoses and all orders for this visit:    Lesion of penis  -     Ambulatory Referral to Urology; Future    Hypertriglyceridemia  -     Lipid panel    Other proteinuria  -     UA w Reflex to Microscopic w Reflex to Culture -Lab Collect; Future          Subjective:      Patient ID: Prudence Steve is a 25 y o  male  Patient presents today to review blood work  Has continued lesion to penis, reports that the area is scaly and dry, sometimes itchy  The following portions of the patient's history were reviewed and updated as appropriate: allergies, current medications, past family history, past medical history, past social history, past surgical history and problem list     Review of Systems   Constitutional: Negative for activity change, appetite change, chills, diaphoresis and fever  HENT: Negative for congestion, ear discharge, ear pain, postnasal drip, rhinorrhea, sinus pressure, sinus pain and sore throat  Eyes: Negative for pain, discharge, itching and visual disturbance  Respiratory: Negative for cough, chest tightness, shortness of breath and wheezing  Cardiovascular: Negative for chest pain, palpitations and leg swelling  Gastrointestinal: Negative for abdominal pain, constipation, diarrhea, nausea and vomiting  Endocrine: Negative for polydipsia, polyphagia and polyuria  Genitourinary: Negative for difficulty urinating, dysuria and urgency  Musculoskeletal: Negative for arthralgias, back pain and neck pain  Skin: Negative for rash and wound  Neurological: Negative for dizziness, weakness, numbness and headaches  Past Medical History:   Diagnosis Date   • Asthma    • Closed displaced fracture of base of fifth metacarpal bone of right hand with routine healing 9/19/2018   • Otitis media    • Shoulder pain     left   • Sinus infection          Current Outpatient Medications:   •  albuterol (PROVENTIL HFA,VENTOLIN HFA) 90 mcg/act inhaler, Inhale 2 puffs every 6 (six) hours as needed for wheezing, Disp: , Rfl:   •  cetirizine (ZyrTEC) 10 mg tablet, Take 1 tablet (10 mg total) by mouth daily, Disp: 30 tablet, Rfl: 1  •  famotidine (PEPCID) 20 mg tablet, TAKE 1 TABLET BY MOUTH TWICE A DAY, Disp: 30 tablet, Rfl: 3  •  hydrOXYzine pamoate (VISTARIL) 25 mg capsule, Take 1 capsule (25 mg total) by mouth 3 (three) times a day as needed for anxiety, Disp: 30 capsule, Rfl: 0  •  ondansetron (ZOFRAN) 4 mg tablet, Take 1 tablet (4 mg total) by mouth every 8 (eight) hours as needed for nausea or vomiting, Disp: 20 tablet, Rfl: 0  •  pantoprazole (PROTONIX) 40 mg tablet, Take 1 tablet (40 mg total) by mouth daily, Disp: 14 tablet, Rfl: 0    No Known Allergies    Social History   Past Surgical History:   Procedure Laterality Date   • CIRCUMCISION     • [place] SURGICAL ARTHROSCOPY SHOULDER CAPSULORRHAPHY Left 5/9/2016    Procedure: ARTHROSCOPY SHOULDER , ANTERIOR LABRAL REPAIR ;  Surgeon: Nikolay Bird MD;  Location: [facility] OR;  Service: Orthopedics     Family History   Problem Relation Age of Onset   • Colon polyps Mother    • Hypothyroidism Mother    • ADD / ADHD Sister    • Anemia Maternal Grandmother    • Asthma Maternal Grandmother    • Hypothyroidism Maternal Grandmother    • Cirrhosis Maternal Grandfather    • Hepatitis Maternal Grandfather    • Liver disease Maternal Grandfather    • Breast cancer Paternal Grandmother    • Inflammatory bowel disease Maternal Aunt    • ADD / ADHD Maternal Uncle    • Asthma Maternal Uncle    • Hypertension Maternal Uncle    • No Known Problems Father        Objective:  /82 (BP Location: Left "arm, Patient Position: Sitting, Cuff Size: Large)   Pulse 99   Temp (!) 97 3 °F (36 3 °C) (Temporal)   Ht 5' 8\" (1 727 m)   Wt 109 kg (240 lb)   SpO2 97% Comment: room air  BMI 36 49 kg/m²     Recent Results (from the past 1344 hour(s))   Hemoglobin A1C    Collection Time: 06/07/23  2:41 PM   Result Value Ref Range    Hemoglobin A1C 5 3 Normal 3 8-5 6%; PreDiabetic 5 7-6 4%;  Diabetic >=6 5%; Glycemic control for adults with diabetes <7 0% %     mg/dl   CBC and differential    Collection Time: 06/07/23  2:41 PM   Result Value Ref Range    WBC 10 47 (H) 4 31 - 10 16 Thousand/uL    RBC 5 38 3 88 - 5 62 Million/uL    Hemoglobin 15 3 12 0 - 17 0 g/dL    Hematocrit 47 3 36 5 - 49 3 %    MCV 88 82 - 98 fL    MCH 28 4 26 8 - 34 3 pg    MCHC 32 3 31 4 - 37 4 g/dL    RDW 12 5 11 6 - 15 1 %    MPV 11 0 8 9 - 12 7 fL    Platelets 063 474 - 921 Thousands/uL    nRBC 0 /100 WBCs    Neutrophils Relative 69 43 - 75 %    Immat GRANS % 1 0 - 2 %    Lymphocytes Relative 20 14 - 44 %    Monocytes Relative 8 4 - 12 %    Eosinophils Relative 1 0 - 6 %    Basophils Relative 1 0 - 1 %    Neutrophils Absolute 7 39 1 85 - 7 62 Thousands/µL    Immature Grans Absolute 0 08 0 00 - 0 20 Thousand/uL    Lymphocytes Absolute 2 05 0 60 - 4 47 Thousands/µL    Monocytes Absolute 0 83 0 17 - 1 22 Thousand/µL    Eosinophils Absolute 0 05 0 00 - 0 61 Thousand/µL    Basophils Absolute 0 07 0 00 - 0 10 Thousands/µL   Lipid panel    Collection Time: 06/07/23  2:41 PM   Result Value Ref Range    Cholesterol 174 See Comment mg/dL    Triglycerides 303 (H) See Comment mg/dL    HDL, Direct 41 >=40 mg/dL    LDL Calculated 72 0 - 100 mg/dL    Non-HDL-Chol (CHOL-HDL) 133 mg/dl   Herpes I/II IgG Antibodies    Collection Time: 06/07/23  2:41 PM   Result Value Ref Range    HSV 1 IgG <0 91 0 00 - 0 90 index    HSV 2 IGG, TYPE SPEC <0 91 0 00 - 0 90 index   Chlamydia/GC amplified DNA by PCR    Collection Time: 06/07/23  2:41 PM    Specimen: Urine, Other " Result Value Ref Range    N gonorrhoeae, DNA Probe Negative Negative    Chlamydia trachomatis, DNA Probe Negative Negative   UA w Reflex to Microscopic w Reflex to Culture    Collection Time: 06/07/23  2:41 PM    Specimen: Urine   Result Value Ref Range    Color, UA Yellow     Clarity, UA Clear     Specific Gravity, UA 1 029 1 003 - 1 030    pH, UA 7 0 4 5, 5 0, 5 5, 6 0, 6 5, 7 0, 7 5, 8 0    Leukocytes, UA Negative Negative    Nitrite, UA Negative Negative    Protein, UA 30 (1+) (A) Negative mg/dl    Glucose, UA Negative Negative mg/dl    Ketones, UA Negative Negative mg/dl    Urobilinogen, UA 2 0 (A) <2 0 mg/dl mg/dl    Bilirubin, UA Negative Negative    Occult Blood, UA Negative Negative   Comprehensive metabolic panel    Collection Time: 06/07/23  2:41 PM   Result Value Ref Range    Sodium 135 135 - 147 mmol/L    Potassium 3 4 (L) 3 5 - 5 3 mmol/L    Chloride 104 96 - 108 mmol/L    CO2 27 21 - 32 mmol/L    ANION GAP 4 4 - 13 mmol/L    BUN 15 5 - 25 mg/dL    Creatinine 1 10 0 60 - 1 30 mg/dL    Glucose 87 65 - 140 mg/dL    Calcium 8 8 8 3 - 10 1 mg/dL    AST 24 5 - 45 U/L    ALT 55 12 - 78 U/L    Alkaline Phosphatase 101 46 - 116 U/L    Total Protein 8 2 6 4 - 8 4 g/dL    Albumin 4 0 3 5 - 5 0 g/dL    Total Bilirubin 0 23 0 20 - 1 00 mg/dL    eGFR 94 ml/min/1 73sq m   Urine Microscopic    Collection Time: 06/07/23  2:41 PM   Result Value Ref Range    RBC, UA None Seen None Seen, 1-2 /hpf    WBC, UA 1-2 None Seen, 1-2 /hpf    Epithelial Cells None Seen None Seen, Occasional /hpf    Bacteria, UA Occasional None Seen, Occasional /hpf    MUCUS THREADS Occasional (A) None Seen    Amorphous Crystals, UA Occasional    Trichomonas vaginalis/Mycoplasma genitalium PCR    Collection Time: 06/07/23  2:47 PM    Specimen: Urine, Clean Catch   Result Value Ref Range    Trichomonas vaginalis Negative Negative    Mycoplasma genitalium Negative Negative            Physical Exam  Constitutional:       General: He is not in acute distress  Appearance: He is well-developed  He is not diaphoretic  HENT:      Head: Normocephalic and atraumatic  Right Ear: External ear normal       Left Ear: External ear normal       Nose: Nose normal       Mouth/Throat:      Pharynx: No oropharyngeal exudate  Eyes:      General:         Right eye: No discharge  Left eye: No discharge  Conjunctiva/sclera: Conjunctivae normal       Pupils: Pupils are equal, round, and reactive to light  Neck:      Thyroid: No thyromegaly  Cardiovascular:      Rate and Rhythm: Normal rate and regular rhythm  Heart sounds: Normal heart sounds  No murmur heard  No friction rub  No gallop  Pulmonary:      Effort: Pulmonary effort is normal  No respiratory distress  Breath sounds: Normal breath sounds  No stridor  No wheezing or rales  Abdominal:      General: Bowel sounds are normal  There is no distension  Palpations: Abdomen is soft  Tenderness: There is no abdominal tenderness  Musculoskeletal:      Cervical back: Normal range of motion and neck supple  Lymphadenopathy:      Cervical: No cervical adenopathy  Skin:     General: Skin is warm and dry  Findings: No erythema or rash  Neurological:      Mental Status: He is alert and oriented to person, place, and time  Psychiatric:         Behavior: Behavior normal          Thought Content:  Thought content normal          Judgment: Judgment normal

## 2023-06-28 NOTE — ASSESSMENT & PLAN NOTE
Advised to start fishoil  Recommend healthy lifestyle choices for your cholesterol  Low fat/low cholesterol diet  Limit/avoid red meat  Eat more lean meat - chicken breast, ground turkey, fish  Exercise 30 mins at least 5 times a week as tolerated  Anai Naranjo  (RN)  2017 06:00:59 Ev Lobato)  2017 13:55:07

## 2023-06-29 ENCOUNTER — TELEPHONE (OUTPATIENT)
Dept: UROLOGY | Facility: MEDICAL CENTER | Age: 23
End: 2023-06-29

## 2023-06-29 NOTE — TELEPHONE ENCOUNTER
New Patient    What is the reason for the patient’s appointment?:  Lesion of penis  What office location does the patient prefer?:[email or web address]   Does patient have Imaging/Lab Results:    Have patient records been requested?:  If No, are the records showing in Epic:       INSURANCE:   Do we accept the patient's insurance or is the patient Self-Pay?:    Insurance Provider:  Plan Type/Number:   Member ID#:       HISTORY:   Has the patient had any previous Urologist(s)? :No    Was the patient seen in the ED?:No    Has the patient had any outside testing done?:  No  Does the patient have a personal history of cancer?:No

## 2023-07-03 ENCOUNTER — OFFICE VISIT (OUTPATIENT)
Dept: UROLOGY | Facility: CLINIC | Age: 23
End: 2023-07-03

## 2023-07-03 VITALS
HEIGHT: 68 IN | OXYGEN SATURATION: 97 % | DIASTOLIC BLOOD PRESSURE: 72 MMHG | WEIGHT: 240 LBS | HEART RATE: 122 BPM | RESPIRATION RATE: 16 BRPM | BODY MASS INDEX: 36.37 KG/M2 | SYSTOLIC BLOOD PRESSURE: 128 MMHG

## 2023-07-03 DIAGNOSIS — N48.9 LESION OF PENIS: ICD-10-CM

## 2023-07-03 NOTE — PROGRESS NOTES
Office Visit- Urology  Alida Abbott 2000 MRN: [id number]      Assessment/Discussion/Plan    25 y.o. male managed by     1. Penile lesion   -Patient presents the office for evaluation of penile lesion  -States that its been present for about a month and a half  -bump is benign in appearance. Does not appear to be molluscum contagiosum, herpes, condyloma, or penile cancer. Would advise observation on this lesion  -Patient also notes an area where there seems to be a fissure within penile skin. Just noticed it recently there is no pain associated with it  -Patient has had previous STI testing by PCP with obtainment of herpes, chlamydia/gonorrhea, trichomonas that was negative. Obtainment of RPR for syphilis screening  -Continue to monitor at this time. Follow-up in 1 month      Chief Complaint:   Joslyn Juárez is a 25 y.o. male presenting to the office for an initial evaluation regarding  penile lesion         Subjective    -58-year-old male  -Presents to the office today for evaluation of penile lesion  -States that over the past month and a half he has noticed a bump on the right side of the penile skin. He is circumcised  -He states that when it first formed there was some pustular material coming out of it but there is no bleeding or ulceration  -He denies any associated pain  -He states that he also noticed an area on the dorsal aspect of his penis in which it looks like he has a cut  -He was seen by his PCP who ordered STI testing with herpes, chlamydia gonorrhea , trichomonas which was negative   -Sexually active with 1 female partner. 2 prior partners. No prior history of STIs  -No past urologic history  -He denies any pain associated with the lesions. Denies scrotal pain, testicular pain, dysuria, gross hematuria, or urinary symptoms. ROS:   Review of Systems   Constitutional: Negative. Negative for chills, fatigue and fever. HENT: Negative.     Respiratory: Negative for shortness of breath. Cardiovascular: Negative for chest pain. Gastrointestinal: Negative. Negative for abdominal pain. Endocrine: Negative. Musculoskeletal: Negative. Skin: Negative. Neurological: Negative. Negative for dizziness and light-headedness. Hematological: Negative. Psychiatric/Behavioral: Negative.           Past Medical History  Past Medical History:   Diagnosis Date   • Asthma    • Closed displaced fracture of base of fifth metacarpal bone of right hand with routine healing 9/19/2018   • Otitis media    • Shoulder pain     left   • Sinus infection        Past Surgical History  Past Surgical History:   Procedure Laterality Date   • CIRCUMCISION     • DE SURGICAL ARTHROSCOPY SHOULDER CAPSULORRHAPHY Left 5/9/2016    Procedure: ARTHROSCOPY SHOULDER , ANTERIOR LABRAL REPAIR ;  Surgeon: Eric Vaughan MD;  Location: [facility];  Service: Orthopedics       Past Family History  Family History   Problem Relation Age of Onset   • Colon polyps Mother    • Hypothyroidism Mother    • ADD / ADHD Sister    • Anemia Maternal Grandmother    • Asthma Maternal Grandmother    • Hypothyroidism Maternal Grandmother    • Cirrhosis Maternal Grandfather    • Hepatitis Maternal Grandfather    • Liver disease Maternal Grandfather    • Breast cancer Paternal Grandmother    • Inflammatory bowel disease Maternal Aunt    • ADD / ADHD Maternal Uncle    • Asthma Maternal Uncle    • Hypertension Maternal Uncle    • No Known Problems Father        Past Social history  Social History     Socioeconomic History   • Marital status: Single     Spouse name: Not on file   • Number of children: Not on file   • Years of education: Not on file   • Highest education level: Not on file   Occupational History   • Not on file   Tobacco Use   • Smoking status: Never   • Smokeless tobacco: Never   Vaping Use   • Vaping Use: Some days   • Start date: 1/1/2022   • Substances: Nicotine   Substance and Sexual Activity   • Alcohol use: No   • Drug use: No   • Sexual activity: Yes     Partners: Female     Birth control/protection: Condom Male     Comment: cell    Other Topics Concern   • Not on file   Social History Narrative   • Not on file     Social Determinants of Health     Financial Resource Strain: Not on file   Food Insecurity: Not on file   Transportation Needs: Not on file   Physical Activity: Not on file   Stress: Not on file   Social Connections: Not on file   Intimate Partner Violence: Not on file   Housing Stability: Not on file       Current Medications  Current Outpatient Medications   Medication Sig Dispense Refill   • albuterol (PROVENTIL HFA,VENTOLIN HFA) 90 mcg/act inhaler Inhale 2 puffs every 6 (six) hours as needed for wheezing     • cetirizine (ZyrTEC) 10 mg tablet Take 1 tablet (10 mg total) by mouth daily 30 tablet 1   • famotidine (PEPCID) 20 mg tablet TAKE 1 TABLET BY MOUTH TWICE A DAY 30 tablet 3   • hydrOXYzine pamoate (VISTARIL) 25 mg capsule Take 1 capsule (25 mg total) by mouth 3 (three) times a day as needed for anxiety 30 capsule 0   • ondansetron (ZOFRAN) 4 mg tablet Take 1 tablet (4 mg total) by mouth every 8 (eight) hours as needed for nausea or vomiting 20 tablet 0   • pantoprazole (PROTONIX) 40 mg tablet Take 1 tablet (40 mg total) by mouth daily 14 tablet 0     No current facility-administered medications for this visit. Allergies  No Known Allergies    OBJECTIVE    Vitals   Vitals:    07/03/23 1324   BP: 128/72   BP Location: Left arm   Patient Position: Sitting   Cuff Size: Large   Pulse: (!) 122   Resp: 16   SpO2: 97%   Weight: 109 kg (240 lb)   Height: 5' 8" (1.727 m)       PVR:    Physical Exam  Constitutional:       General: He is not in acute distress. Appearance: Normal appearance. He is normal weight. He is not ill-appearing or toxic-appearing. HENT:      Head: Normocephalic and atraumatic.    Eyes:      Conjunctiva/sclera: Conjunctivae normal.   Cardiovascular:      Rate and Rhythm: Normal rate. Pulses: Normal pulses. Pulmonary:      Effort: Pulmonary effort is normal. No respiratory distress. Abdominal:      Tenderness: There is no right CVA tenderness or left CVA tenderness. Genitourinary:     Comments: Small 1 mm in diameter whitish/yellowish papule on the right side aspect of distal penile skin likely representative of a folliculitis. See picture below for area of skin tear. Skin partly hooded underneath a region ledge of skin with approximately 2 mm lineal cut with application of tautness to skin  Musculoskeletal:         General: Normal range of motion. Cervical back: Normal range of motion and neck supple. Skin:     General: Skin is warm and dry. Neurological:      General: No focal deficit present. Mental Status: He is alert and oriented to person, place, and time. Cranial Nerves: No cranial nerve deficit. Psychiatric:         Mood and Affect: Mood normal.         Behavior: Behavior normal.         Thought Content: Thought content normal.              Labs:     Lab Results   Component Value Date    CREATININE 1.10 06/07/2023      Lab Results   Component Value Date    HGBA1C 5.3 06/07/2023     Lab Results   Component Value Date    CALCIUM 8.8 06/07/2023    K 3.4 (L) 06/07/2023    CO2 27 06/07/2023     06/07/2023    BUN 15 06/07/2023    CREATININE 1.10 06/07/2023       I have personally reviewed all pertinent lab results and reviewed with patient    Imaging                Anuradha Chaney PA-C  Date: 7/3/2023 Time: 1:41 PM  [facility]    This note was written using fluency dictation software. Please excuse any resulting minor grammatical errors.

## 2023-07-17 ENCOUNTER — OFFICE VISIT (OUTPATIENT)
Dept: INTERNAL MEDICINE CLINIC | Age: 23
End: 2023-07-17
Payer: COMMERCIAL

## 2023-07-17 VITALS
WEIGHT: 241 LBS | SYSTOLIC BLOOD PRESSURE: 124 MMHG | HEIGHT: 68 IN | TEMPERATURE: 98.1 F | OXYGEN SATURATION: 99 % | DIASTOLIC BLOOD PRESSURE: 68 MMHG | HEART RATE: 98 BPM | BODY MASS INDEX: 36.53 KG/M2

## 2023-07-17 DIAGNOSIS — R39.9 UTI SYMPTOMS: ICD-10-CM

## 2023-07-17 DIAGNOSIS — L02.91 ABSCESS: Primary | ICD-10-CM

## 2023-07-17 DIAGNOSIS — N48.9 LESION OF PENIS: ICD-10-CM

## 2023-07-17 LAB
BACTERIA UR QL AUTO: ABNORMAL /HPF
BILIRUB UR QL STRIP: NEGATIVE
CLARITY UR: CLEAR
COLOR UR: ABNORMAL
GLUCOSE UR STRIP-MCNC: NEGATIVE MG/DL
HGB UR QL STRIP.AUTO: NEGATIVE
KETONES UR STRIP-MCNC: NEGATIVE MG/DL
LEUKOCYTE ESTERASE UR QL STRIP: NEGATIVE
NITRITE UR QL STRIP: NEGATIVE
NON-SQ EPI CELLS URNS QL MICRO: ABNORMAL /HPF
PH UR STRIP.AUTO: 5.5 [PH]
PROT UR STRIP-MCNC: ABNORMAL MG/DL
RBC #/AREA URNS AUTO: ABNORMAL /HPF
SL AMB  POCT GLUCOSE, UA: NORMAL
SL AMB LEUKOCYTE ESTERASE,UA: NORMAL
SL AMB POCT BILIRUBIN,UA: NORMAL
SL AMB POCT BLOOD,UA: NORMAL
SL AMB POCT CLARITY,UA: CLEAR
SL AMB POCT COLOR,UA: YELLOW
SL AMB POCT KETONES,UA: NORMAL
SL AMB POCT NITRITE,UA: NORMAL
SL AMB POCT PH,UA: 5.5
SL AMB POCT SPECIFIC GRAVITY,UA: 1.03
SL AMB POCT URINE PROTEIN: NORMAL
SL AMB POCT UROBILINOGEN: 0.2
SP GR UR STRIP.AUTO: 1.02 (ref 1–1.03)
UROBILINOGEN UR STRIP-ACNC: <2 MG/DL
WBC #/AREA URNS AUTO: ABNORMAL /HPF

## 2023-07-17 PROCEDURE — 87491 CHLMYD TRACH DNA AMP PROBE: CPT | Performed by: INTERNAL MEDICINE

## 2023-07-17 PROCEDURE — 99214 OFFICE O/P EST MOD 30 MIN: CPT | Performed by: INTERNAL MEDICINE

## 2023-07-17 PROCEDURE — 87591 N.GONORRHOEAE DNA AMP PROB: CPT | Performed by: INTERNAL MEDICINE

## 2023-07-17 PROCEDURE — 81001 URINALYSIS AUTO W/SCOPE: CPT | Performed by: INTERNAL MEDICINE

## 2023-07-17 PROCEDURE — 81002 URINALYSIS NONAUTO W/O SCOPE: CPT | Performed by: INTERNAL MEDICINE

## 2023-07-17 RX ORDER — NAPROXEN 500 MG/1
500 TABLET ORAL 2 TIMES DAILY WITH MEALS
Qty: 20 TABLET | Refills: 0 | Status: SHIPPED | OUTPATIENT
Start: 2023-07-17

## 2023-07-17 RX ORDER — AMOXICILLIN AND CLAVULANATE POTASSIUM 875; 125 MG/1; MG/1
1 TABLET, FILM COATED ORAL EVERY 12 HOURS SCHEDULED
Qty: 14 TABLET | Refills: 0 | Status: SHIPPED | OUTPATIENT
Start: 2023-07-17 | End: 2023-07-24

## 2023-07-17 NOTE — LETTER
July 17, 2023     Patient: Amber Masters  YOB: 2000  Date of Visit: 7/17/2023      To Whom it May Concern:    Vidal Motley is under my professional care. Geena Oh was seen in my office on 7/17/2023. Geena Oh may return to work on 07/19/2023 . If you have any questions or concerns, please don't hesitate to call.          Sincerely,          Adriana Starr,         CC: No Recipients

## 2023-07-17 NOTE — PROGRESS NOTES
Assessment/Plan:    Abscess  -We will start patient on amoxicillin-clavulanate 875-125 mg twice daily for 7 days  -He was counseled to take the antibiotic with food and take a probiotic while taking antibiotics  -We will give patient an excuse note for today's as requested  -Follow-up in 1 week    UTI symptoms  -Point-of-care urine dipstick was positive for trace blood but negative for nitrites and leukocytes  -We will order a urinalysis with microscopy and reflex culture and sensitivity and follow-up with the results  -We will also order GC chlamydia in urine    Penile lesion  -Currently following with urology  -Continue to follow with urology     Diagnoses and all orders for this visit:    Abscess  -     amoxicillin-clavulanate (AUGMENTIN) 875-125 mg per tablet; Take 1 tablet by mouth every 12 (twelve) hours for 7 days  -     naproxen (Naprosyn) 500 mg tablet; Take 1 tablet (500 mg total) by mouth 2 (two) times a day with meals    UTI symptoms  -     POCT urine dip  -     UA w Reflex to Microscopic w Reflex to Culture - Clinic Collect  -     Chlamydia/GC amplified DNA by PCR  -     Urine Microscopic    Lesion of penis           Subjective:      Patient ID: Sayra Royal is a 25 y.o. male. HPI  Pt presents with C/o of a ? blister on his upper R thigh near his groin for the past 2 days  It is growing bigger and painful   No pruritis   Has never had it before   Denies any other lumps anywhere  He admits to difficulty with urination as well as decreased volume of urine and feeling of incomplete emptying but denies dysuria, urinary frequency, urgency, nocturia, penile discharge, lymph node swelling.   Sexually active   Has had one female partner for the past one year   Always has protected intercourse with a condom   Never had an std   Seeing urology already for a penile lesion   Last sexual intercourse - 2 weeks ago and was protected with same partner of one year   He would like to have a work note to return to work in 2 days  States that he has pain in the area and that it is a 6/10 and hurts more when the area rubs against anything and he is a  for car parts. The following portions of the patient's history were reviewed and updated as appropriate:   He  has a past medical history of Asthma, Closed displaced fracture of base of fifth metacarpal bone of right hand with routine healing (9/19/2018), Otitis media, Shoulder pain, and Sinus infection. He   Patient Active Problem List    Diagnosis Date Noted   • Other proteinuria 06/28/2023   • Hypertriglyceridemia 06/28/2023   • Lesion of penis 06/07/2023   • Viral infection 01/31/2022   • Dysthymia 07/30/2021   • Anxiety 07/30/2021   • Obesity (BMI 35.0-39.9 without comorbidity) 04/27/2018   • Seasonal allergies 08/11/2014   • Asthma 12/12/2013   • Obesity 12/12/2013     He  has a past surgical history that includes pr surgical arthroscopy shoulder capsulorrhaphy (Left, 5/9/2016) and Circumcision. His family history includes ADD / ADHD in his maternal uncle and sister; Anemia in his maternal grandmother; Asthma in his maternal grandmother and maternal uncle; Breast cancer in his paternal grandmother; Cirrhosis in his maternal grandfather; Colon polyps in his mother; Hepatitis in his maternal grandfather; Hypertension in his maternal uncle; Hypothyroidism in his maternal grandmother and mother; Inflammatory bowel disease in his maternal aunt; Liver disease in his maternal grandfather; No Known Problems in his father. He  reports that he has never smoked. He has never used smokeless tobacco. He reports that he does not drink alcohol and does not use drugs.   Current Outpatient Medications   Medication Sig Dispense Refill   • albuterol (PROVENTIL HFA,VENTOLIN HFA) 90 mcg/act inhaler Inhale 2 puffs every 6 (six) hours as needed for wheezing     • amoxicillin-clavulanate (AUGMENTIN) 875-125 mg per tablet Take 1 tablet by mouth every 12 (twelve) hours for 7 days 14 tablet 0   • cetirizine (ZyrTEC) 10 mg tablet Take 1 tablet (10 mg total) by mouth daily 30 tablet 1   • famotidine (PEPCID) 20 mg tablet TAKE 1 TABLET BY MOUTH TWICE A DAY 30 tablet 3   • hydrOXYzine pamoate (VISTARIL) 25 mg capsule Take 1 capsule (25 mg total) by mouth 3 (three) times a day as needed for anxiety 30 capsule 0   • naproxen (Naprosyn) 500 mg tablet Take 1 tablet (500 mg total) by mouth 2 (two) times a day with meals 20 tablet 0   • ondansetron (ZOFRAN) 4 mg tablet Take 1 tablet (4 mg total) by mouth every 8 (eight) hours as needed for nausea or vomiting 20 tablet 0   • pantoprazole (PROTONIX) 40 mg tablet Take 1 tablet (40 mg total) by mouth daily 14 tablet 0     No current facility-administered medications for this visit. Current Outpatient Medications on File Prior to Visit   Medication Sig   • albuterol (PROVENTIL HFA,VENTOLIN HFA) 90 mcg/act inhaler Inhale 2 puffs every 6 (six) hours as needed for wheezing   • cetirizine (ZyrTEC) 10 mg tablet Take 1 tablet (10 mg total) by mouth daily   • famotidine (PEPCID) 20 mg tablet TAKE 1 TABLET BY MOUTH TWICE A DAY   • hydrOXYzine pamoate (VISTARIL) 25 mg capsule Take 1 capsule (25 mg total) by mouth 3 (three) times a day as needed for anxiety   • ondansetron (ZOFRAN) 4 mg tablet Take 1 tablet (4 mg total) by mouth every 8 (eight) hours as needed for nausea or vomiting   • pantoprazole (PROTONIX) 40 mg tablet Take 1 tablet (40 mg total) by mouth daily     No current facility-administered medications on file prior to visit. He has No Known Allergies. .    Review of Systems   Constitutional: Negative for activity change, chills, fatigue, fever and unexpected weight change. HENT: Negative for ear pain, postnasal drip, rhinorrhea, sinus pressure and sore throat. Eyes: Negative for pain. Respiratory: Negative for cough, choking, chest tightness, shortness of breath and wheezing.     Cardiovascular: Negative for chest pain, palpitations and leg swelling. Gastrointestinal: Negative for abdominal pain, constipation, diarrhea, nausea and vomiting. Genitourinary: Positive for decreased urine volume (for the past one week with a feeling of incomplete emptying ) and difficulty urinating. Negative for dysuria and hematuria. Musculoskeletal: Negative for arthralgias, back pain, gait problem, joint swelling, myalgias and neck stiffness. Lump on the inner R thigh    Skin: Negative for pallor and rash. Neurological: Negative for dizziness, tremors, seizures, syncope, light-headedness and headaches. Hematological: Negative for adenopathy. Psychiatric/Behavioral: Negative for behavioral problems. Objective:      /68 (BP Location: Left arm, Patient Position: Sitting, Cuff Size: Large)   Pulse 98   Temp 98.1 °F (36.7 °C) (Temporal)   Ht 5' 8" (1.727 m)   Wt 109 kg (241 lb)   SpO2 99%   BMI 36.64 kg/m²          Physical Exam  Constitutional:       General: He is not in acute distress. Appearance: He is well-developed. He is not diaphoretic. HENT:      Head: Normocephalic and atraumatic. Right Ear: External ear normal.      Left Ear: External ear normal.      Nose: Nose normal.      Mouth/Throat:      Mouth: Mucous membranes are dry. Pharynx: Posterior oropharyngeal erythema present. No oropharyngeal exudate. Eyes:      General: No scleral icterus. Right eye: No discharge. Left eye: No discharge. Conjunctiva/sclera: Conjunctivae normal.      Pupils: Pupils are equal, round, and reactive to light. Neck:      Thyroid: No thyromegaly. Vascular: No JVD. Trachea: No tracheal deviation. Cardiovascular:      Rate and Rhythm: Normal rate and regular rhythm. Heart sounds: Normal heart sounds. No murmur heard. No friction rub. No gallop. Pulmonary:      Effort: Pulmonary effort is normal. No respiratory distress. Breath sounds: Normal breath sounds. No wheezing or rales. Chest:      Chest wall: No tenderness. Abdominal:      General: Bowel sounds are normal. There is no distension. Palpations: Abdomen is soft. There is no mass. Tenderness: There is no abdominal tenderness. There is no guarding or rebound. Musculoskeletal:         General: No deformity. Normal range of motion. Cervical back: Normal range of motion and neck supple. Right upper leg: Swelling (tender erythematous swelling on the upper medial thigh that measures about 1.5 cm and tender to touch concerning for an abscess, firm to touch and not fluctuant ) and tenderness present. Legs:    Lymphadenopathy:      Cervical: No cervical adenopathy. Skin:     General: Skin is warm and dry. Coloration: Skin is not pale. Findings: No erythema or rash. Neurological:      Mental Status: He is alert and oriented to person, place, and time. Cranial Nerves: No cranial nerve deficit. Motor: No abnormal muscle tone. Coordination: Coordination normal.      Deep Tendon Reflexes: Reflexes are normal and symmetric.    Psychiatric:         Behavior: Behavior normal.           Office Visit on 07/17/2023   Component Date Value Ref Range Status   • LEUKOCYTE ESTERASE,UA 07/17/2023 NEG   Final   • NITRITE,UA 07/17/2023 NEG   Final   • SL AMB POCT UROBILINOGEN 07/17/2023 0.2   Final   • POCT URINE PROTEIN 07/17/2023 NEG   Final   •  PH,UA 07/17/2023 5.5   Final   • BLOOD,UA 07/17/2023 Trace- intact   Final   • SPECIFIC GRAVITY,UA 07/17/2023 1.030   Final   • KETONES,UA 07/17/2023 NEG   Final   • BILIRUBIN,UA 07/17/2023 NEG   Final   • GLUCOSE, UA 07/17/2023 NEG   Final   •  COLOR,UA 07/17/2023 yellow   Final   • CLARITY,UA 07/17/2023 clear   Final   • Color, UA 07/17/2023 Light Yellow   Final   • Clarity, UA 07/17/2023 Clear   Final   • Specific Gravity, UA 07/17/2023 1.025  1.003 - 1.030 Final   • pH, UA 07/17/2023 5.5  4.5, 5.0, 5.5, 6.0, 6.5, 7.0, 7.5, 8.0 Final   • Leukocytes, UA 07/17/2023 Negative  Negative Final   • Nitrite, UA 07/17/2023 Negative  Negative Final   • Protein, UA 07/17/2023 Trace (A)  Negative mg/dl Final   • Glucose, UA 07/17/2023 Negative  Negative mg/dl Final   • Ketones, UA 07/17/2023 Negative  Negative mg/dl Final   • Urobilinogen, UA 07/17/2023 <2.0  <2.0 mg/dl mg/dl Final   • Bilirubin, UA 07/17/2023 Negative  Negative Final   • Occult Blood, UA 07/17/2023 Negative  Negative Final   • RBC, UA 07/17/2023 2-4 (A)  None Seen, 1-2 /hpf Final   • WBC, UA 07/17/2023 None Seen  None Seen, 1-2 /hpf Final   • Epithelial Cells 07/17/2023 None Seen  None Seen, Occasional /hpf Final   • Bacteria, UA 07/17/2023 None Seen  None Seen, Occasional /hpf Final   Appointment on 06/07/2023   Component Date Value Ref Range Status   • Trichomonas vaginalis 06/07/2023 Negative  Negative Final   • Mycoplasma genitalium 06/07/2023 Negative  Negative Final   • Sodium 06/07/2023 135  135 - 147 mmol/L Final   • Potassium 06/07/2023 3.4 (L)  3.5 - 5.3 mmol/L Final   • Chloride 06/07/2023 104  96 - 108 mmol/L Final   • CO2 06/07/2023 27  21 - 32 mmol/L Final   • ANION GAP 06/07/2023 4  4 - 13 mmol/L Final   • BUN 06/07/2023 15  5 - 25 mg/dL Final   • Creatinine 06/07/2023 1.10  0.60 - 1.30 mg/dL Final    Standardized to IDMS reference method   • Glucose 06/07/2023 87  65 - 140 mg/dL Final    If the patient is fasting, the ADA then defines impaired fasting glucose as > 100 mg/dL and diabetes as > or equal to 123 mg/dL. Specimen collection should occur prior to Sulfasalazine administration due to the potential for falsely depressed results. Specimen collection should occur prior to Sulfapyridine administration due to the potential for falsely elevated results. • Calcium 06/07/2023 8.8  8.3 - 10.1 mg/dL Final   • AST 06/07/2023 24  5 - 45 U/L Final    Specimen collection should occur prior to Sulfasalazine administration due to the potential for falsely depressed results.     • ALT 06/07/2023 55 12 - 78 U/L Final    Specimen collection should occur prior to Sulfasalazine and/or Sulfapyridine administration due to the potential for falsely depressed results. • Alkaline Phosphatase 06/07/2023 101  46 - 116 U/L Final   • Total Protein 06/07/2023 8.2  6.4 - 8.4 g/dL Final   • Albumin 06/07/2023 4.0  3.5 - 5.0 g/dL Final   • Total Bilirubin 06/07/2023 0.23  0.20 - 1.00 mg/dL Final    Use of this assay is not recommended for patients undergoing treatment with eltrombopag due to the potential for falsely elevated results. • eGFR 06/07/2023 94  ml/min/1.73sq m Final   Office Visit on 06/07/2023   Component Date Value Ref Range Status   • Hemoglobin A1C 06/07/2023 5.3  Normal 3.8-5.6%; PreDiabetic 5.7-6.4%;  Diabetic >=6.5%; Glycemic control for adults with diabetes <7.0% % Final   • EAG 06/07/2023 105  mg/dl Final   • WBC 06/07/2023 10.47 (H)  4.31 - 10.16 Thousand/uL Final   • RBC 06/07/2023 5.38  3.88 - 5.62 Million/uL Final   • Hemoglobin 06/07/2023 15.3  12.0 - 17.0 g/dL Final   • Hematocrit 06/07/2023 47.3  36.5 - 49.3 % Final   • MCV 06/07/2023 88  82 - 98 fL Final   • MCH 06/07/2023 28.4  26.8 - 34.3 pg Final   • MCHC 06/07/2023 32.3  31.4 - 37.4 g/dL Final   • RDW 06/07/2023 12.5  11.6 - 15.1 % Final   • MPV 06/07/2023 11.0  8.9 - 12.7 fL Final   • Platelets 05/00/8841 332  149 - 390 Thousands/uL Final   • nRBC 06/07/2023 0  /100 WBCs Final   • Neutrophils Relative 06/07/2023 69  43 - 75 % Final   • Immat GRANS % 06/07/2023 1  0 - 2 % Final   • Lymphocytes Relative 06/07/2023 20  14 - 44 % Final   • Monocytes Relative 06/07/2023 8  4 - 12 % Final   • Eosinophils Relative 06/07/2023 1  0 - 6 % Final   • Basophils Relative 06/07/2023 1  0 - 1 % Final   • Neutrophils Absolute 06/07/2023 7.39  1.85 - 7.62 Thousands/µL Final   • Immature Grans Absolute 06/07/2023 0.08  0.00 - 0.20 Thousand/uL Final   • Lymphocytes Absolute 06/07/2023 2.05  0.60 - 4.47 Thousands/µL Final   • Monocytes Absolute 06/07/2023 0.83 0.17 - 1.22 Thousand/µL Final   • Eosinophils Absolute 06/07/2023 0.05  0.00 - 0.61 Thousand/µL Final   • Basophils Absolute 06/07/2023 0.07  0.00 - 0.10 Thousands/µL Final   • Cholesterol 06/07/2023 174  See Comment mg/dL Final    Cholesterol:         Pediatric <18 Years        Desirable          <170 mg/dL      Borderline High    170-199 mg/dL      High               >=200 mg/dL        Adult >=18 Years            Desirable         <200 mg/dL      Borderline High   200-239 mg/dL      High              >239 mg/dL     • Triglycerides 06/07/2023 303 (H)  See Comment mg/dL Final    Triglyceride:     0-9Y            <75mg/dL     10Y-17Y         <90 mg/dL       >=18Y     Normal          <150 mg/dL     Borderline High 150-199 mg/dL     High            200-499 mg/dL        Very High       >499 mg/dL    Specimen collection should occur prior to N-Acetylcysteine or Metamizole administration due to the potential for falsely depressed results. • HDL, Direct 06/07/2023 41  >=40 mg/dL Final    Specimen collection should occur prior to Metamizole administration due to the potential for falsley depressed results. • LDL Calculated 06/07/2023 72  0 - 100 mg/dL Final    LDL Cholesterol:     Optimal           <100 mg/dl     Near Optimal      100-129 mg/dl     Above Optimal       Borderline High 130-159 mg/dl       High            160-189 mg/dl       Very High       >189 mg/dl         This screening LDL is a calculated result. It does not have the accuracy of the Direct Measured LDL in the monitoring of patients with hyperlipidemia and/or statin therapy. Direct Measure LDL (ZZY696) must be ordered separately in these patients.    • Non-HDL-Chol (CHOL-HDL) 06/07/2023 133  mg/dl Final   • HSV 1 IgG 06/07/2023 <0.91  0.00 - 0.90 index Final                                     Negative        <0.91                                   Equivocal 0.91 - 1.09                                   Positive        >1.09   Note: Negative indicates no antibodies detected to   HSV-1. Equivocal may suggest early infection. If   clinically appropriate, retest at later date. Positive   indicates antibodies detected to HSV-1.   • HSV 2 IGG, TYPE SPEC 06/07/2023 <0.91  0.00 - 0.90 index Final                                     Negative        <0.91                                   Equivocal 0.91 - 1.09                                   Positive        >1.09   Note: Negative indicates no HSV-2 antibodies detected. Positive indicates HSV-2 antibodies detected. Equivocal and low positive HSV-2 screens   (Index 0.91-5.00) may be false positive and are   reflexed to supplemental testing in accordance with   CDC guidelines.    • N gonorrhoeae, DNA Probe 06/07/2023 Negative  Negative Final   • Chlamydia trachomatis, DNA Probe 06/07/2023 Negative  Negative Final   • Color, UA 06/07/2023 Yellow   Final   • Clarity, UA 06/07/2023 Clear   Final   • Specific Gravity, UA 06/07/2023 1.029  1.003 - 1.030 Final   • pH, UA 06/07/2023 7.0  4.5, 5.0, 5.5, 6.0, 6.5, 7.0, 7.5, 8.0 Final   • Leukocytes, UA 06/07/2023 Negative  Negative Final   • Nitrite, UA 06/07/2023 Negative  Negative Final   • Protein, UA 06/07/2023 30 (1+) (A)  Negative mg/dl Final   • Glucose, UA 06/07/2023 Negative  Negative mg/dl Final   • Ketones, UA 06/07/2023 Negative  Negative mg/dl Final   • Urobilinogen, UA 06/07/2023 2.0 (A)  <2.0 mg/dl mg/dl Final   • Bilirubin, UA 06/07/2023 Negative  Negative Final   • Occult Blood, UA 06/07/2023 Negative  Negative Final   • RBC, UA 06/07/2023 None Seen  None Seen, 1-2 /hpf Final   • WBC, UA 06/07/2023 1-2  None Seen, 1-2 /hpf Final   • Epithelial Cells 06/07/2023 None Seen  None Seen, Occasional /hpf Final   • Bacteria, UA 06/07/2023 Occasional  None Seen, Occasional /hpf Final   • MUCUS THREADS 06/07/2023 Occasional (A)  None Seen Final   • Amorphous Crystals, UA 06/07/2023 Occasional   Final

## 2023-07-18 LAB
C TRACH DNA SPEC QL NAA+PROBE: NEGATIVE
N GONORRHOEA DNA SPEC QL NAA+PROBE: NEGATIVE

## 2023-07-27 ENCOUNTER — RA CDI HCC (OUTPATIENT)
Dept: OTHER | Facility: HOSPITAL | Age: 23
End: 2023-07-27

## 2023-07-27 NOTE — PROGRESS NOTES
[id number]    NOT on the BPA- please assess using MEAT for 2023 billing    [address] coding opportunities          Chart Reviewed number of suggestions sent to Provider: 1     Patients Insurance        Commercial Insurance: [place]

## 2023-08-07 ENCOUNTER — OFFICE VISIT (OUTPATIENT)
Dept: UROLOGY | Facility: CLINIC | Age: 23
End: 2023-08-07

## 2023-08-07 VITALS
OXYGEN SATURATION: 98 % | BODY MASS INDEX: 38.54 KG/M2 | RESPIRATION RATE: 16 BRPM | HEIGHT: 66 IN | HEART RATE: 111 BPM | WEIGHT: 239.8 LBS

## 2023-08-07 DIAGNOSIS — N48.9 LESION OF PENIS: Primary | ICD-10-CM

## 2023-08-07 LAB
BACTERIA UR QL AUTO: ABNORMAL /HPF
BILIRUB UR QL STRIP: NEGATIVE
CLARITY UR: CLEAR
COLOR UR: YELLOW
GLUCOSE UR STRIP-MCNC: NEGATIVE MG/DL
HGB UR QL STRIP.AUTO: ABNORMAL
KETONES UR STRIP-MCNC: NEGATIVE MG/DL
LEUKOCYTE ESTERASE UR QL STRIP: NEGATIVE
MUCOUS THREADS UR QL AUTO: ABNORMAL
NITRITE UR QL STRIP: NEGATIVE
NON-SQ EPI CELLS URNS QL MICRO: ABNORMAL /HPF
PH UR STRIP.AUTO: 5.5 [PH]
PROT UR STRIP-MCNC: ABNORMAL MG/DL
RBC #/AREA URNS AUTO: ABNORMAL /HPF
SP GR UR STRIP.AUTO: >=1.03 (ref 1–1.03)
UROBILINOGEN UR STRIP-ACNC: <2 MG/DL
WBC #/AREA URNS AUTO: ABNORMAL /HPF

## 2023-08-07 PROCEDURE — 87086 URINE CULTURE/COLONY COUNT: CPT

## 2023-08-07 PROCEDURE — 81001 URINALYSIS AUTO W/SCOPE: CPT

## 2023-08-07 NOTE — PROGRESS NOTES
Office Visit- Urology  Yasmin Bridges 2000 MRN: [id number]      Assessment/Discussion/Plan    25 y.o. male managed by     1. Penile lesion   -The bump that was previously visualized has now resolved spontaneously  -The fissure like lesion is still present but is remained stable with no changes per the patient nor any significant change on physical exam with perhaps decrease  erythema of area  -At this time we will continue with observation I will have patient follow-up with MD  -Obtainment of RPR for completion of STI work-up    2. Microscopic hematuria  -On 7/17 patient presented to PCP for evaluation of UTI-like symptoms  -Urinalysis with microscopy was obtained with only abnormality being 2-4 RBC  -Patient also had a abscess on the upper medial thigh that was treated with antibiotics  -Patient stated that his urinary symptoms have completely resolved  -Discussed with patient the AUA guidelines for clinically significant microscopic hematuria and how the report of 2-4 RBC is of unknown significance  -We will send out urine testing today to ensure no persistent microscopic hematuria-urine testing came back with 1-2 RBC. -We will plan for just routine obtainment of urinalysis with microscopy for further surveillance. Chief Complaint:   Cristofer Navarrete is a 25 y.o. male presenting to the office for a follow up visit regarding  Penile lesion        Subjective    Hx 93  25year-old male  -Presents to the office today for evaluation of penile lesion  -States that over the past month and a half he has noticed a bump on the right side of the penile skin.   He is circumcised  -He states that when it first formed there was some pustular material coming out of it but there is no bleeding or ulceration  -He denies any associated pain  -He states that he also noticed an area on the dorsal aspect of his penis in which it looks like he has a cut  -He was seen by his PCP who ordered STI testing with herpes, chlamydia gonorrhea , trichomonas which was negative   -Sexually active with 1 female partner. 2 prior partners. No prior history of STIs  -No past urologic history  -He denies any pain associated with the lesions. Denies scrotal pain, testicular pain, dysuria, gross hematuria, or urinary symptoms.       Hx 8/7  -At recent physical patient had positive blood on urine dip and was sent for microscopy with 2-4 RBC. No urine testing was obtained at that time  -Associated urinary symptoms include difficulty with urination as he feel he has not emptying his bladder. This has now completely subsided  -Tobacco use includes vape products with 1 cartridge every 3 weeks  -He denies any gross hematuria, flank pain or dysuria  -felt hard to use the bathroom (felt like he wasn't emptying the bladder a week and ahalf but then felt better.   -vapes 1 one every three weeks  -no blood in the urine, flank pain, or dysuria. ROS:   Review of Systems   Constitutional: Negative. Negative for chills, fatigue and fever. HENT: Negative. Respiratory: Negative for shortness of breath. Cardiovascular: Negative for chest pain. Gastrointestinal: Negative. Negative for abdominal pain. Endocrine: Negative. Musculoskeletal: Negative. Skin: Negative. Neurological: Negative. Negative for dizziness and light-headedness. Hematological: Negative. Psychiatric/Behavioral: Negative.           Past Medical History  Past Medical History:   Diagnosis Date   • Asthma    • Closed displaced fracture of base of fifth metacarpal bone of right hand with routine healing 9/19/2018   • Otitis media    • Shoulder pain     left   • Sinus infection        Past Surgical History  Past Surgical History:   Procedure Laterality Date   • CIRCUMCISION     • [place] SURGICAL ARTHROSCOPY SHOULDER CAPSULORRHAPHY Left 5/9/2016    Procedure: ARTHROSCOPY SHOULDER , ANTERIOR LABRAL REPAIR ;  Surgeon: Kindra Matute MD;  Location: [facility] OR;  Service: Orthopedics       Past Family History  Family History   Problem Relation Age of Onset   • Colon polyps Mother    • Hypothyroidism Mother    • ADD / ADHD Sister    • Anemia Maternal Grandmother    • Asthma Maternal Grandmother    • Hypothyroidism Maternal Grandmother    • Cirrhosis Maternal Grandfather    • Hepatitis Maternal Grandfather    • Liver disease Maternal Grandfather    • Breast cancer Paternal Grandmother    • Inflammatory bowel disease Maternal Aunt    • ADD / ADHD Maternal Uncle    • Asthma Maternal Uncle    • Hypertension Maternal Uncle    • No Known Problems Father        Past Social history  Social History     Socioeconomic History   • Marital status: Single     Spouse name: Not on file   • Number of children: Not on file   • Years of education: Not on file   • Highest education level: Not on file   Occupational History   • Not on file   Tobacco Use   • Smoking status: Never   • Smokeless tobacco: Never   Vaping Use   • Vaping Use: Some days   • Start date: 1/1/2022   • Substances: Nicotine   Substance and Sexual Activity   • Alcohol use: No   • Drug use: No   • Sexual activity: Yes     Partners: Female     Birth control/protection: Condom Male     Comment: cell    Other Topics Concern   • Not on file   Social History Narrative   • Not on file     Social Determinants of Health     Financial Resource Strain: Not on file   Food Insecurity: Not on file   Transportation Needs: Not on file   Physical Activity: Not on file   Stress: Not on file   Social Connections: Not on file   Intimate Partner Violence: Not on file   Housing Stability: Not on file       Current Medications  Current Outpatient Medications   Medication Sig Dispense Refill   • albuterol (PROVENTIL HFA,VENTOLIN HFA) 90 mcg/act inhaler Inhale 2 puffs every 6 (six) hours as needed for wheezing     • cetirizine (ZyrTEC) 10 mg tablet Take 1 tablet (10 mg total) by mouth daily 30 tablet 1   • famotidine (PEPCID) 20 mg tablet TAKE 1 TABLET BY MOUTH TWICE A DAY 30 tablet 3   • hydrOXYzine pamoate (VISTARIL) 25 mg capsule Take 1 capsule (25 mg total) by mouth 3 (three) times a day as needed for anxiety 30 capsule 0   • naproxen (Naprosyn) 500 mg tablet Take 1 tablet (500 mg total) by mouth 2 (two) times a day with meals 20 tablet 0   • ondansetron (ZOFRAN) 4 mg tablet Take 1 tablet (4 mg total) by mouth every 8 (eight) hours as needed for nausea or vomiting 20 tablet 0   • pantoprazole (PROTONIX) 40 mg tablet Take 1 tablet (40 mg total) by mouth daily 14 tablet 0     No current facility-administered medications for this visit. Allergies  No Known Allergies    OBJECTIVE    Vitals   Vitals:    08/07/23 1105   Pulse: (!) 111   Resp: 16   SpO2: 98%   Weight: 109 kg (239 lb 12.8 oz)   Height: 5' 6" (1.676 m)       PVR:    Physical Exam  Constitutional:       General: He is not in acute distress. Appearance: Normal appearance. He is normal weight. He is not ill-appearing or toxic-appearing. HENT:      Head: Normocephalic and atraumatic. Eyes:      Conjunctiva/sclera: Conjunctivae normal.   Cardiovascular:      Rate and Rhythm: Normal rate. Pulmonary:      Effort: Pulmonary effort is normal. No respiratory distress. Genitourinary:     Comments: Stability of penile lesion with perhaps decreased erythema. Musculoskeletal:      Cervical back: Normal range of motion and neck supple. Skin:     General: Skin is warm and dry. Neurological:      General: No focal deficit present. Mental Status: He is alert and oriented to person, place, and time. Cranial Nerves: No cranial nerve deficit.           Labs:     Lab Results   Component Value Date    CREATININE 1.10 06/07/2023      Lab Results   Component Value Date    HGBA1C 5.3 06/07/2023     Lab Results   Component Value Date    CALCIUM 8.8 06/07/2023    K 3.4 (L) 06/07/2023    CO2 27 06/07/2023     06/07/2023    BUN 15 06/07/2023    CREATININE 1.10 06/07/2023       I have personally reviewed all pertinent lab results and reviewed with patient    Imaging       Liliam Randall PA-C  Date: 8/7/2023 Time: 11:42 AM  [facility]    This note was written using fluency dictation software. Please excuse any resulting minor grammatical errors.

## 2023-08-08 LAB — BACTERIA UR CULT: NORMAL

## 2023-08-18 ENCOUNTER — TELEPHONE (OUTPATIENT)
Dept: UROLOGY | Facility: MEDICAL CENTER | Age: 23
End: 2023-08-18

## 2023-08-18 NOTE — TELEPHONE ENCOUNTER
----- Message from Jelly Jose PA-C sent at 8/17/2023  7:14 PM EDT -----  Called patient to inform him that the urine testing not reveal clinically significant microscopic hematuria follow-up with MD as planned we will obtain repeat urine testing at that time nothing further needs to be done at this time besides obtainment of RPR  Pt is aware of results and urine will be repeated at the next appt. Pt reminded to have RPR done.

## 2023-09-20 ENCOUNTER — TELEMEDICINE (OUTPATIENT)
Dept: INTERNAL MEDICINE CLINIC | Facility: OTHER | Age: 23
End: 2023-09-20
Payer: COMMERCIAL

## 2023-09-20 VITALS — HEIGHT: 68 IN | BODY MASS INDEX: 37.13 KG/M2 | TEMPERATURE: 99.2 F | WEIGHT: 245 LBS

## 2023-09-20 DIAGNOSIS — J06.9 VIRAL UPPER RESPIRATORY TRACT INFECTION: Primary | ICD-10-CM

## 2023-09-20 PROCEDURE — 99213 OFFICE O/P EST LOW 20 MIN: CPT | Performed by: STUDENT IN AN ORGANIZED HEALTH CARE EDUCATION/TRAINING PROGRAM

## 2023-09-20 RX ORDER — GUAIFENESIN 600 MG/1
1200 TABLET, EXTENDED RELEASE ORAL EVERY 12 HOURS SCHEDULED
Qty: 28 TABLET | Refills: 0 | Status: SHIPPED | OUTPATIENT
Start: 2023-09-20 | End: 2023-09-27

## 2023-09-20 RX ORDER — FLUTICASONE PROPIONATE 50 MCG
1 SPRAY, SUSPENSION (ML) NASAL DAILY
Qty: 9.9 ML | Refills: 0 | Status: SHIPPED | OUTPATIENT
Start: 2023-09-20 | End: 2023-09-27

## 2023-09-20 NOTE — LETTER
September 20, 2023     Patient: Darrel Hu  YOB: 2000  Date of Visit: 9/20/2023      To Whom it May Concern:    Brady Mares is under my professional care. Yuli Hayes was seen as a virtual visit on 9/20/2023. Yuli Hayes may return to work on 9/23/24 . If you have any questions or concerns, please don't hesitate to call.          Sincerely,          Quang Machado DO        CC: No Recipients

## 2023-09-20 NOTE — PROGRESS NOTES
Virtual Regular Visit    Verification of patient location:    Patient is located at Home in the following state in which I hold an active license [place]      Assessment/Plan:    Problem List Items Addressed This Visit    None  Visit Diagnoses     Viral upper respiratory tract infection    -  Primary  Endorses 2 days of sore throat, productive cough with green sputum, fatigue, congestion, subjective fever, constipation, central abdominal pain. Home Covid test negative this morning. Has not needed to use albuterol inhaler. No wheezing, decreased PO intake, nausea, vomiting, body aches. Appears in no acute distress with nasal sounding voice. · Start Flonase, Mucinex as below  · Advised throat lozenges and warm salt water gargles for sore throat  · Tylenol as needed for body aches  · Provided return to work note for 9/23/23 at patient's request  · If he develops fever/symptoms worsen, he will call the office for another appointment      Relevant Medications    fluticasone (FLONASE) 50 mcg/act nasal spray    guaiFENesin (MUCINEX) 600 mg 12 hr tablet               Reason for visit is   Chief Complaint   Patient presents with   • Cold Like Symptoms     Covid tested this morning and was negative    • Sore Throat   • Cough   • Nasal Congestion   • HM     Patient due for:   Physical         Encounter provider Brett Machado DO    Provider located at [address] [place] [id number]      Recent Visits  No visits were found meeting these conditions. Showing recent visits within past 7 days and meeting all other requirements  Today's Visits  Date Type Provider Dept   09/20/23 Telemedicine Brett Machado DO [facility]   Showing today's visits and meeting all other requirements  Future Appointments  No visits were found meeting these conditions.   Showing future appointments within next 150 days and meeting all other requirements       The patient was identified by name and date of birth. [address] was informed that this is a telemedicine visit and that the visit is being conducted through the [address] [place] [address] 22 Now platform. He agrees to proceed. .  My office door was closed. No one else was in the room. He acknowledged consent and understanding of privacy and security of the video platform. The patient has agreed to participate and understands they can discontinue the visit at any time. Patient is aware this is a billable service. Subjective  Jovi Puri is a 25 y.o. male with history of asthma, obesity, GERD. Symptoms started 2 days ago with congestion, sore throat, and productive cough with dark green sputum. Endorses fatigue, sweating and feeling hot, sneezing, mild central abdominal pain, constipation. Did not check his temperature. Taking allergy medication which helps temporarily. Hasn't needed to use albuterol inhaler. Feeling slightly better than yesterday but not back to normal.  Home Covid test negative this morning. Denies wheezing, ear pain, hematuria, dysuria, chest pain, sick contacts.        Past Medical History:   Diagnosis Date   • Asthma    • Closed displaced fracture of base of fifth metacarpal bone of right hand with routine healing 9/19/2018   • Otitis media    • Shoulder pain     left   • Sinus infection        Past Surgical History:   Procedure Laterality Date   • CIRCUMCISION     • [place] SURGICAL ARTHROSCOPY SHOULDER CAPSULORRHAPHY Left 5/9/2016    Procedure: ARTHROSCOPY SHOULDER , ANTERIOR LABRAL REPAIR ;  Surgeon: Raphael Price MD;  Location: [facility];  Service: Orthopedics       Current Outpatient Medications   Medication Sig Dispense Refill   • albuterol (PROVENTIL HFA,VENTOLIN HFA) 90 mcg/act inhaler Inhale 2 puffs every 6 (six) hours as needed for wheezing     • cetirizine (ZyrTEC) 10 mg tablet Take 1 tablet (10 mg total) by mouth daily 30 tablet 1   • famotidine (PEPCID) 20 mg tablet TAKE 1 TABLET BY MOUTH TWICE A DAY 30 tablet 3   • hydrOXYzine pamoate (VISTARIL) 25 mg capsule Take 1 capsule (25 mg total) by mouth 3 (three) times a day as needed for anxiety 30 capsule 0   • naproxen (Naprosyn) 500 mg tablet Take 1 tablet (500 mg total) by mouth 2 (two) times a day with meals 20 tablet 0   • ondansetron (ZOFRAN) 4 mg tablet Take 1 tablet (4 mg total) by mouth every 8 (eight) hours as needed for nausea or vomiting 20 tablet 0   • pantoprazole (PROTONIX) 40 mg tablet Take 1 tablet (40 mg total) by mouth daily 14 tablet 0     No current facility-administered medications for this visit. No Known Allergies    Review of Systems   Constitutional: Positive for chills and fatigue. Negative for appetite change, diaphoresis, fever and unexpected weight change. HENT: Positive for congestion, sneezing and sore throat. Negative for ear pain and rhinorrhea. Eyes: Negative for visual disturbance. Respiratory: Positive for cough. Negative for shortness of breath and wheezing. Cardiovascular: Negative for chest pain, palpitations and leg swelling. Gastrointestinal: Positive for abdominal pain and constipation. Negative for diarrhea, nausea and vomiting. Genitourinary: Negative for difficulty urinating, dysuria and hematuria. Musculoskeletal: Negative for myalgias. Neurological: Negative for dizziness, weakness, light-headedness, numbness and headaches. Video Exam    Vitals:    09/20/23 1003   Temp: 99.2 °F (37.3 °C)   TempSrc: Oral   Weight: 111 kg (245 lb)   Height: 5' 8" (1.727 m)       Physical Exam  Constitutional:       General: He is not in acute distress. Comments: Nasal sounding voice   Eyes:      General: No scleral icterus. Pulmonary:      Effort: No respiratory distress. Comments: No conversational dyspnea  Neurological:      Mental Status: He is alert.           Visit Time  Total Visit Duration: 15 minutes    Juliano Estrada DO [place]  [address] [facility]  Internal Medicine, PGY-3

## 2023-11-08 ENCOUNTER — OFFICE VISIT (OUTPATIENT)
Dept: UROLOGY | Facility: CLINIC | Age: 23
End: 2023-11-08
Payer: COMMERCIAL

## 2023-11-08 VITALS
SYSTOLIC BLOOD PRESSURE: 120 MMHG | BODY MASS INDEX: 37.13 KG/M2 | DIASTOLIC BLOOD PRESSURE: 80 MMHG | HEIGHT: 68 IN | WEIGHT: 245 LBS | HEART RATE: 85 BPM | OXYGEN SATURATION: 97 %

## 2023-11-08 DIAGNOSIS — N48.9 PENILE LESION: Primary | ICD-10-CM

## 2023-11-08 DIAGNOSIS — R31.29 MICROHEMATURIA: ICD-10-CM

## 2023-11-08 PROCEDURE — 99213 OFFICE O/P EST LOW 20 MIN: CPT | Performed by: UROLOGY

## 2023-11-08 NOTE — LETTER
November 8, 2023     [facility]  [address]  [facility] [address]    Patient: Yolanda Hubbard   YOB: 2000   Date of Visit: 11/8/2023       Dear Dr. Augustin Ma:    Thank you for referring Marcos Clayton to me for evaluation. Below are my notes for this consultation. If you have questions, please do not hesitate to call me. I look forward to following your patient along with you. Sincerely,        Madison Thompson DO        CC: No Recipients    Doroteo Bear  11/8/2023 11:27 AM  Sign when Signing Visit  [address] [place] [address] Dr is a 21 y.o. male with penile lesions    No anticoagulation    Saw [facility] uro in August 2023 for skin lesion. Had some odd skin bridging. August STI workup testing ordered. Negative GC, trichomonas/mycoplasma, HSV. UA 7/17/23: 2-4 RBC, no WBC, no bact    UA 8/7/23: 1-2 RBC, 1-2 WBC, no bact    Lesion unchanged on exam 11/8/23. No pain, no pruritus. Looks like simple skin bridge. Assessment and plan:     Penile lesion    Workup as listed above. Results previously reviewed with patient. Exam today appeared to be simple skin bridge. No clinical concern for STI or concerning infection. F/u PRN    Microhem    Repeat UA August 2023 showed 1-2 RBC    Does not need micro hem w/u        PLAN  -f/u prn          Portions of the above record have been created with voice recognition software. Occasional wrong word or "sound alike" substitution may have occurred due to the inherent limitations of voice recognition software. Read the chart carefully and recognize, using context, where substitution may have occurred.       Madison Thompson DO        Chief Complaint     Penile lesion      History of Present Illness   See summary above    No fevers or chills    The following portions of the patient's history were reviewed and updated as appropriate: allergies, current medications, past family history, past medical history, past social history, past surgical history and problem list.            Review of Systems     Review of Systems   Constitutional:  Negative for chills and fever. Gastrointestinal:  Negative for abdominal pain. Genitourinary:  Negative for dysuria and hematuria. Neurological:  Negative for dizziness and headaches. Psychiatric/Behavioral:  Negative for agitation and behavioral problems. Allergies     No Known Allergies    Physical Exam     Physical Exam  Constitutional:       General: He is not in acute distress. HENT:      Head: Normocephalic and atraumatic. Pulmonary:      Effort: Pulmonary effort is normal. No respiratory distress. Abdominal:      General: Abdomen is flat. Palpations: Abdomen is soft. Tenderness: There is no right CVA tenderness or left CVA tenderness. Genitourinary:     Testes: Normal.      Comments: Small lesion on distal shaft left side no erythema no discharge nontender, appears to be skin bridge  Neurological:      Mental Status: He is alert.              Vital Signs  Vitals:    11/08/23 1045   BP: 120/80   BP Location: Left arm   Patient Position: Sitting   Cuff Size: Standard   Pulse: 85   SpO2: 97%   Weight: 111 kg (245 lb)   Height: 5' 8" (1.727 m)         Current Medications       Current Outpatient Medications:   •  albuterol (PROVENTIL HFA,VENTOLIN HFA) 90 mcg/act inhaler, Inhale 2 puffs every 6 (six) hours as needed for wheezing, Disp: , Rfl:   •  cetirizine (ZyrTEC) 10 mg tablet, Take 1 tablet (10 mg total) by mouth daily, Disp: 30 tablet, Rfl: 1  •  famotidine (PEPCID) 20 mg tablet, TAKE 1 TABLET BY MOUTH TWICE A DAY, Disp: 30 tablet, Rfl: 3  •  hydrOXYzine pamoate (VISTARIL) 25 mg capsule, Take 1 capsule (25 mg total) by mouth 3 (three) times a day as needed for anxiety, Disp: 30 capsule, Rfl: 0  •  naproxen (Naprosyn) 500 mg tablet, Take 1 tablet (500 mg total) by mouth 2 (two) times a day with meals, Disp: 20 tablet, Rfl: 0  •  ondansetron (ZOFRAN) 4 mg tablet, Take 1 tablet (4 mg total) by mouth every 8 (eight) hours as needed for nausea or vomiting, Disp: 20 tablet, Rfl: 0  •  pantoprazole (PROTONIX) 40 mg tablet, Take 1 tablet (40 mg total) by mouth daily, Disp: 14 tablet, Rfl: 0  •  fluticasone (FLONASE) 50 mcg/act nasal spray, 1 spray into each nostril daily for 7 days, Disp: 9.9 mL, Rfl: 0      Active Problems     Patient Active Problem List   Diagnosis   • Asthma   • Obesity   • Seasonal allergies   • Obesity (BMI 35.0-39.9 without comorbidity)   • Dysthymia   • Anxiety   • Viral infection   • Lesion of penis   • Other proteinuria   • Hypertriglyceridemia         Past Medical History     Past Medical History:   Diagnosis Date   • Asthma    • Closed displaced fracture of base of fifth metacarpal bone of right hand with routine healing 9/19/2018   • Otitis media    • Shoulder pain     left   • Sinus infection          Surgical History     Past Surgical History:   Procedure Laterality Date   • CIRCUMCISION     • [place] SURGICAL ARTHROSCOPY SHOULDER CAPSULORRHAPHY Left 5/9/2016    Procedure: ARTHROSCOPY SHOULDER , ANTERIOR LABRAL REPAIR ;  Surgeon: Karie Jerez MD;  Location: [facility];  Service: Orthopedics         Family History     Family History   Problem Relation Age of Onset   • Colon polyps Mother    • Hypothyroidism Mother    • ADD / ADHD Sister    • Anemia Maternal Grandmother    • Asthma Maternal Grandmother    • Hypothyroidism Maternal Grandmother    • Cirrhosis Maternal Grandfather    • Hepatitis Maternal Grandfather    • Liver disease Maternal Grandfather    • Breast cancer Paternal Grandmother    • Inflammatory bowel disease Maternal Aunt    • ADD / ADHD Maternal Uncle    • Asthma Maternal Uncle    • Hypertension Maternal Uncle    • No Known Problems Father          Social History     Social History     Social History     Tobacco Use   Smoking Status Never   Smokeless Tobacco Never         Pertinent Lab Values     Lab Results   Component Value Date    CREATININE 1.10 06/07/2023       No results found for: "PSA"

## 2023-11-08 NOTE — PROGRESS NOTES
UROLOGY FOLLOW-UP ENCOUNTER    Jovi Puri is a 21 y.o. male with penile lesions    No anticoagulation    Saw [facility] uro in August 2023 for skin lesion. Had some odd skin bridging. August STI workup testing ordered. Negative GC, trichomonas/mycoplasma, HSV. UA 7/17/23: 2-4 RBC, no WBC, no bact    UA 8/7/23: 1-2 RBC, 1-2 WBC, no bact    Lesion unchanged on exam 11/8/23. No pain, no pruritus. Looks like simple skin bridge. Assessment and plan:     Penile lesion    Workup as listed above. Results previously reviewed with patient. Exam today appeared to be simple skin bridge. No clinical concern for STI or concerning infection. F/u PRN    Microhem    Repeat UA August 2023 showed 1-2 RBC    Does not need micro hem w/u        PLAN  -f/u prn          Portions of the above record have been created with voice recognition software. Occasional wrong word or "sound alike" substitution may have occurred due to the inherent limitations of voice recognition software. Read the chart carefully and recognize, using context, where substitution may have occurred. Laci Alvarenga DO        Chief Complaint     Penile lesion      History of Present Illness   See summary above    No fevers or chills    The following portions of the patient's history were reviewed and updated as appropriate: allergies, current medications, past family history, past medical history, past social history, past surgical history and problem list.            Review of Systems     Review of Systems   Constitutional:  Negative for chills and fever. Gastrointestinal:  Negative for abdominal pain. Genitourinary:  Negative for dysuria and hematuria. Neurological:  Negative for dizziness and headaches. Psychiatric/Behavioral:  Negative for agitation and behavioral problems. Allergies     No Known Allergies    Physical Exam     Physical Exam  Constitutional:       General: He is not in acute distress.   HENT: Head: Normocephalic and atraumatic. Pulmonary:      Effort: Pulmonary effort is normal. No respiratory distress. Abdominal:      General: Abdomen is flat. Palpations: Abdomen is soft. Tenderness: There is no right CVA tenderness or left CVA tenderness. Genitourinary:     Testes: Normal.      Comments: Small lesion on distal shaft left side no erythema no discharge nontender, appears to be skin bridge  Neurological:      Mental Status: He is alert.              Vital Signs  Vitals:    11/08/23 1045   BP: 120/80   BP Location: Left arm   Patient Position: Sitting   Cuff Size: Standard   Pulse: 85   SpO2: 97%   Weight: 111 kg (245 lb)   Height: 5' 8" (1.727 m)         Current Medications       Current Outpatient Medications:     albuterol (PROVENTIL HFA,VENTOLIN HFA) 90 mcg/act inhaler, Inhale 2 puffs every 6 (six) hours as needed for wheezing, Disp: , Rfl:     cetirizine (ZyrTEC) 10 mg tablet, Take 1 tablet (10 mg total) by mouth daily, Disp: 30 tablet, Rfl: 1    famotidine (PEPCID) 20 mg tablet, TAKE 1 TABLET BY MOUTH TWICE A DAY, Disp: 30 tablet, Rfl: 3    hydrOXYzine pamoate (VISTARIL) 25 mg capsule, Take 1 capsule (25 mg total) by mouth 3 (three) times a day as needed for anxiety, Disp: 30 capsule, Rfl: 0    naproxen (Naprosyn) 500 mg tablet, Take 1 tablet (500 mg total) by mouth 2 (two) times a day with meals, Disp: 20 tablet, Rfl: 0    ondansetron (ZOFRAN) 4 mg tablet, Take 1 tablet (4 mg total) by mouth every 8 (eight) hours as needed for nausea or vomiting, Disp: 20 tablet, Rfl: 0    pantoprazole (PROTONIX) 40 mg tablet, Take 1 tablet (40 mg total) by mouth daily, Disp: 14 tablet, Rfl: 0    fluticasone (FLONASE) 50 mcg/act nasal spray, 1 spray into each nostril daily for 7 days, Disp: 9.9 mL, Rfl: 0      Active Problems     Patient Active Problem List   Diagnosis    Asthma    Obesity    Seasonal allergies    Obesity (BMI 35.0-39.9 without comorbidity)    Dysthymia    Anxiety    Viral infection Lesion of penis    Other proteinuria    Hypertriglyceridemia         Past Medical History     Past Medical History:   Diagnosis Date    Asthma     Closed displaced fracture of base of fifth metacarpal bone of right hand with routine healing 9/19/2018    Otitis media     Shoulder pain     left    Sinus infection          Surgical History     Past Surgical History:   Procedure Laterality Date    CIRCUMCISION      [place] SURGICAL ARTHROSCOPY SHOULDER CAPSULORRHAPHY Left 5/9/2016    Procedure: ARTHROSCOPY SHOULDER , ANTERIOR LABRAL REPAIR ;  Surgeon: Manfred Reyes MD;  Location: [facility];  Service: Orthopedics         Family History     Family History   Problem Relation Age of Onset    Colon polyps Mother     Hypothyroidism Mother     ADD / ADHD Sister     Anemia Maternal Grandmother     Asthma Maternal Grandmother     Hypothyroidism Maternal Grandmother     Cirrhosis Maternal Grandfather     Hepatitis Maternal Grandfather     Liver disease Maternal Grandfather     Breast cancer Paternal Grandmother     Inflammatory bowel disease Maternal Aunt     ADD / ADHD Maternal Uncle     Asthma Maternal Uncle     Hypertension Maternal Uncle     No Known Problems Father          Social History     Social History     Social History     Tobacco Use   Smoking Status Never   Smokeless Tobacco Never         Pertinent Lab Values     Lab Results   Component Value Date    CREATININE 1.10 06/07/2023       No results found for: "PSA"

## 2023-12-12 ENCOUNTER — RA CDI HCC (OUTPATIENT)
Dept: OTHER | Facility: HOSPITAL | Age: 23
End: 2023-12-12

## 2023-12-12 NOTE — PROGRESS NOTES
[id number]     NOT on the BPA- please assess using MEAT for 2023 billing        [address] coding opportunities          Chart Reviewed number of suggestions sent to Provider: 1     Patients Insurance        Commercial Insurance: [place]

## 2024-01-08 ENCOUNTER — RA CDI HCC (OUTPATIENT)
Dept: OTHER | Facility: HOSPITAL | Age: 24
End: 2024-01-08

## 2024-01-08 NOTE — PROGRESS NOTES
HCC coding opportunities          Chart Reviewed number of suggestions sent to Provider: 1     Patients Insurance        Commercial Insurance: [employer] Commercial [employer]       
Full range of motion of upper and lower extremities, no joint tenderness/swelling.

## 2024-01-09 ENCOUNTER — OFFICE VISIT (OUTPATIENT)
Age: 24
End: 2024-01-09
Payer: COMMERCIAL

## 2024-01-09 VITALS
SYSTOLIC BLOOD PRESSURE: 128 MMHG | OXYGEN SATURATION: 99 % | HEIGHT: 68 IN | DIASTOLIC BLOOD PRESSURE: 80 MMHG | BODY MASS INDEX: 38.19 KG/M2 | HEART RATE: 78 BPM | WEIGHT: 252 LBS | TEMPERATURE: 98.8 F

## 2024-01-09 DIAGNOSIS — E66.9 OBESITY (BMI 35.0-39.9 WITHOUT COMORBIDITY): ICD-10-CM

## 2024-01-09 DIAGNOSIS — E78.1 HYPERTRIGLYCERIDEMIA: ICD-10-CM

## 2024-01-09 DIAGNOSIS — J45.909 UNCOMPLICATED ASTHMA, UNSPECIFIED ASTHMA SEVERITY, UNSPECIFIED WHETHER PERSISTENT: ICD-10-CM

## 2024-01-09 DIAGNOSIS — Z00.00 ANNUAL PHYSICAL EXAM: Primary | ICD-10-CM

## 2024-01-09 PROCEDURE — 99395 PREV VISIT EST AGE 18-39: CPT | Performed by: INTERNAL MEDICINE

## 2024-01-09 RX ORDER — AMOXICILLIN 250 MG
2 CAPSULE ORAL DAILY
COMMUNITY
Start: 2024-01-04 | End: 2024-01-11

## 2024-01-09 NOTE — ASSESSMENT & PLAN NOTE
BMI Counseling: Body mass index is 38.32 kg/m². The BMI is above normal. Nutrition recommendations include reducing portion sizes, decreasing overall calorie intake, and 3-5 servings of fruits/vegetables daily. Exercise recommendations include moderate aerobic physical activity for 150 minutes/week, exercising 3-5 times per week, and joining a gym.

## 2024-01-09 NOTE — PROGRESS NOTES
ADULT ANNUAL PHYSICAL  [facility] [place]    NAME: Jovi Dowling  AGE: 23 y.o. SEX: male  : 2000     DATE: 2024     Assessment and Plan:     Problem List Items Addressed This Visit          Respiratory    Asthma     Exercise-induced seems well-controlled         Relevant Orders    CBC and differential    Comprehensive metabolic panel       Other    Obesity (BMI 35.0-39.9 without comorbidity)     BMI Counseling: Body mass index is 38.32 kg/m². The BMI is above normal. Nutrition recommendations include reducing portion sizes, decreasing overall calorie intake, and 3-5 servings of fruits/vegetables daily. Exercise recommendations include moderate aerobic physical activity for 150 minutes/week, exercising 3-5 times per week, and joining a gym.          Hypertriglyceridemia     Recommend dietary modification, weight loss         Relevant Orders    Lipid Panel with Direct LDL reflex     Other Visit Diagnoses       Annual physical exam    -  Primary              Immunizations and preventive care screenings were discussed with patient today. Appropriate education was printed on patient's after visit summary.    Counseling:  Alcohol/drug use: discussed moderation in alcohol intake, the recommendations for healthy alcohol use, and avoidance of illicit drug use.  Dental Health: discussed importance of regular tooth brushing, flossing, and dental visits.  Injury prevention: discussed safety/seat belts, safety helmets, smoke detectors, carbon dioxide detectors, and smoking near bedding or upholstery.  Sexual health: discussed sexually transmitted diseases, partner selection, use of condoms, avoidance of unintended pregnancy, and contraceptive alternatives.  Exercise: the importance of regular exercise/physical activity was discussed. Recommend exercise 3-5 times per week for at least 30 minutes.     Reduce using of cigarettes and vaping        Return in about 1 year (around 2025).     Chief Complaint:     Chief Complaint   Patient presents with    Physical Exam      History of Present Illness:     Adult Annual Physical   Patient here for a comprehensive physical exam. The patient reports no problems.  Chief Complaint   Patient presents with    Physical Exam      Diet and Physical Activity  Diet/Nutrition: well balanced diet.   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 .      Depression Screening  PHQ-2/9 Depression Screening    Little interest or pleasure in doing things: 0 - not at all  Feeling down, depressed, or hopeless: 0 - not at all  Trouble falling or staying asleep, or sleeping too much: 0 - not at all  Feeling tired or having little energy: 0 - not at all  Poor appetite or overeatin - not at all  Feeling bad about yourself - or that you are a failure or have let yourself or your family down: 0 - not at all  Trouble concentrating on things, such as reading the newspaper or watching television: 0 - not at all  Moving or speaking so slowly that other people could have noticed. Or the opposite - being so fidgety or restless that you have been moving around a lot more than usual: 0 - not at all  Thoughts that you would be better off dead, or of hurting yourself in some way: 0 - not at all  PHQ-9 Score: 0  PHQ-9 Interpretation: No or Minimal depression       General Health  Sleep: sleeps well.   Hearing: normal - bilateral.  Vision: most recent eye exam >1 year ago.   Dental: regular dental visits.        Health  History of STDs?: no.    Advanced Care Planning  Do you have an advanced directive? yes  Do you have a durable medical power of ? yes     Review of Systems:     Review of Systems   Gastrointestinal:         Mild reflux symptoms controlled with medication.  Takes it as needed only   Allergic/Immunologic: Positive for environmental allergies.   All other systems reviewed and are negative.     Past  Medical History:     Past Medical History:   Diagnosis Date    Asthma     Closed displaced fracture of base of fifth metacarpal bone of right hand with routine healing 9/19/2018    Otitis media     Shoulder pain     left    Sinus infection       Past Surgical History:     Past Surgical History:   Procedure Laterality Date    CIRCUMCISION      [place] SURGICAL ARTHROSCOPY SHOULDER CAPSULORRHAPHY Left 5/9/2016    Procedure: ARTHROSCOPY SHOULDER , ANTERIOR LABRAL REPAIR ;  Surgeon: Luis Miguel Sprague MD;  Location: [facility];  Service: Orthopedics      Social History:     Social History     Socioeconomic History    Marital status: Single     Spouse name: None    Number of children: None    Years of education: None    Highest education level: None   Occupational History    None   Tobacco Use    Smoking status: Never    Smokeless tobacco: Never   Vaping Use    Vaping status: Some Days    Start date: 1/1/2022    Substances: Nicotine   Substance and Sexual Activity    Alcohol use: No    Drug use: No    Sexual activity: Yes     Partners: Female     Birth control/protection: Condom Male     Comment: cell    Other Topics Concern    None   Social History Narrative    None     Social Determinants of Health     Financial Resource Strain: Not on file   Food Insecurity: Not on file   Transportation Needs: Not on file   Physical Activity: Not on file   Stress: Not on file   Social Connections: Not on file   Intimate Partner Violence: Not on file   Housing Stability: Not on file      Family History:     Family History   Problem Relation Age of Onset    Colon polyps Mother     Hypothyroidism Mother     ADD / ADHD Sister     Anemia Maternal Grandmother     Asthma Maternal Grandmother     Hypothyroidism Maternal Grandmother     Cirrhosis Maternal Grandfather     Hepatitis Maternal Grandfather     Liver disease Maternal Grandfather     Breast cancer Paternal Grandmother     Inflammatory bowel disease Maternal Aunt     ADD / ADHD  "Maternal Uncle     Asthma Maternal Uncle     Hypertension Maternal Uncle     No Known Problems Father       Current Medications:     Current Outpatient Medications   Medication Sig Dispense Refill    albuterol (PROVENTIL HFA,VENTOLIN HFA) 90 mcg/act inhaler Inhale 2 puffs every 6 (six) hours as needed for wheezing      cetirizine (ZyrTEC) 10 mg tablet Take 1 tablet (10 mg total) by mouth daily 30 tablet 1    famotidine (PEPCID) 20 mg tablet TAKE 1 TABLET BY MOUTH TWICE A DAY 30 tablet 3    fluticasone (FLONASE) 50 mcg/act nasal spray 1 spray into each nostril daily for 7 days 9.9 mL 0    hydrOXYzine pamoate (VISTARIL) 25 mg capsule Take 1 capsule (25 mg total) by mouth 3 (three) times a day as needed for anxiety 30 capsule 0    naproxen (Naprosyn) 500 mg tablet Take 1 tablet (500 mg total) by mouth 2 (two) times a day with meals 20 tablet 0    ondansetron (ZOFRAN) 4 mg tablet Take 1 tablet (4 mg total) by mouth every 8 (eight) hours as needed for nausea or vomiting 20 tablet 0    pantoprazole (PROTONIX) 40 mg tablet Take 1 tablet (40 mg total) by mouth daily 14 tablet 0    senna-docusate sodium (SENOKOT S) 8.6-50 mg per tablet Take 2 tablets by mouth daily       No current facility-administered medications for this visit.      Allergies:     No Known Allergies   Physical Exam:     /80 (BP Location: Left arm, Patient Position: Sitting, Cuff Size: Large)   Pulse 78   Temp 98.8 °F (37.1 °C)   Ht 5' 8\" (1.727 m)   Wt 114 kg (252 lb)   SpO2 99%   BMI 38.32 kg/m²     Physical Exam     Gen: NAD  Heent: atraumatic, normocephalic  Mouth: is moist  Neck: is supple, no JVD, no carotid bruits.   Heart: is regular Normal s1, s2,  Lungs: are clear to auscultation  Abd: soft, non tender, NABS  Ext: no edema, distal pulses normal  Skin: no rashes, ulcers  Mood: normal affect  Neuro: AAOx3     Catalina Holman MD   [facility] CARE [facility]"

## 2024-07-29 ENCOUNTER — TELEPHONE (OUTPATIENT)
Dept: INTERNAL MEDICINE CLINIC | Facility: OTHER | Age: 24
End: 2024-07-29

## 2025-04-15 ENCOUNTER — OFFICE VISIT (OUTPATIENT)
Dept: URGENT CARE | Facility: CLINIC | Age: 25
End: 2025-04-15
Payer: COMMERCIAL

## 2025-04-15 VITALS
SYSTOLIC BLOOD PRESSURE: 120 MMHG | TEMPERATURE: 99 F | OXYGEN SATURATION: 99 % | HEIGHT: 68 IN | WEIGHT: 237.2 LBS | BODY MASS INDEX: 35.95 KG/M2 | DIASTOLIC BLOOD PRESSURE: 72 MMHG | HEART RATE: 79 BPM | RESPIRATION RATE: 18 BRPM

## 2025-04-15 DIAGNOSIS — K01.1 TOOTH IMPACTION: Primary | ICD-10-CM

## 2025-04-15 DIAGNOSIS — K08.89 PAIN, DENTAL: ICD-10-CM

## 2025-04-15 PROCEDURE — S9083 URGENT CARE CENTER GLOBAL: HCPCS

## 2025-04-15 PROCEDURE — G0382 LEV 3 HOSP TYPE B ED VISIT: HCPCS

## 2025-04-15 RX ORDER — IBUPROFEN 600 MG/1
600 TABLET, FILM COATED ORAL EVERY 6 HOURS PRN
Status: DISCONTINUED | OUTPATIENT
Start: 2025-04-15 | End: 2025-04-15

## 2025-04-15 RX ORDER — IBUPROFEN 600 MG/1
600 TABLET, FILM COATED ORAL EVERY 8 HOURS PRN
Qty: 30 TABLET | Refills: 0 | Status: SHIPPED | OUTPATIENT
Start: 2025-04-15 | End: 2025-04-25

## 2025-04-15 NOTE — PATIENT INSTRUCTIONS
Orajel as directed to the gum around the impacted wisdom tooth    Motrin 600 mg every 6-8 hours as needed for pain    Ice to the area for comfort    Follow-up with the oral surgeon to have your wisdom tooth removed.  Follow-up with your dentist

## 2025-04-15 NOTE — LETTER
April 15, 2025     Patient: Jovi Dowling   YOB: 2000   Date of Visit: 4/15/2025       To Whom It May Concern:    It is my medical opinion that Jovi Dowling may return to work on 04/16/2025 .    If you have any questions or concerns, please don't hesitate to call.         Sincerely,        FREDDY Woodruff    CC: No Recipients

## 2025-04-15 NOTE — PROGRESS NOTES
[facility]        NAME: Jovi Dowling is a 24 y.o. male  : 2000    MRN: [id number]  DATE: April 15, 2025  TIME: 1:46 PM    Assessment and Plan   Tooth impaction [K01.1]  1. Tooth impaction  Ambulatory Referral to Oral Maxillofacial Surgery      2. Pain, dental  ibuprofen (MOTRIN) 600 mg tablet    DISCONTINUED: ibuprofen (MOTRIN) tablet 600 mg            Patient Instructions   Orajel as directed to the gum around the impacted wisdom tooth    Motrin 600 mg every 6-8 hours as needed for pain    Ice to the area for comfort    Follow-up with the oral surgeon to have your wisdom tooth removed.  Follow-up with your dentist    Follow up with PCP in 3-5 days.  Proceed to  ER if symptoms worsen.    If tests have been performed at [facility], our office will contact you with results if changes need to be made to the care plan discussed with you at the visit.  [facility]    Chief Complaint     Chief Complaint   Patient presents with    Dental Pain     Started three days ago, he states the pain randomly occurs, top left wisdom tooth, describes the pain as throbbing, sore throat, headache, fatigue, and he's been managing with Motrin Dual for relief          History of Present Illness       This is a 24-year-old male who presents today with pain in the left upper wisdom tooth.  He states last time he was at his dentist they said they were impacted and needed to come out.  He is having a hard time finding someone who will let him make a payment plan to have the wisdom tooth removed.  He states that the pain is throbbing in nature and gives him a headache.  He has been taking the Motrin dual and putting ice in his mouth which helps.  Upon exam no redness noted to the front right corner of the tooth has come through the gumline.  No redness or swelling noted in the gums    Dental Pain         Review of Systems   Review of Systems   Constitutional: Negative.    HENT:   Positive for dental problem.    Respiratory: Negative.     Cardiovascular: Negative.    Gastrointestinal: Negative.    Genitourinary: Negative.    Neurological:  Positive for headaches.         Current Medications       Current Outpatient Medications:     ibuprofen (MOTRIN) 600 mg tablet, Take 1 tablet (600 mg total) by mouth every 8 (eight) hours as needed for mild pain for up to 10 days, Disp: 30 tablet, Rfl: 0    albuterol (PROVENTIL HFA,VENTOLIN HFA) 90 mcg/act inhaler, Inhale 2 puffs every 6 (six) hours as needed for wheezing, Disp: , Rfl:     cetirizine (ZyrTEC) 10 mg tablet, Take 1 tablet (10 mg total) by mouth daily, Disp: 30 tablet, Rfl: 1    famotidine (PEPCID) 20 mg tablet, TAKE 1 TABLET BY MOUTH TWICE A DAY, Disp: 30 tablet, Rfl: 3    fluticasone (FLONASE) 50 mcg/act nasal spray, 1 spray into each nostril daily for 7 days, Disp: 9.9 mL, Rfl: 0    hydrOXYzine pamoate (VISTARIL) 25 mg capsule, Take 1 capsule (25 mg total) by mouth 3 (three) times a day as needed for anxiety, Disp: 30 capsule, Rfl: 0    naproxen (Naprosyn) 500 mg tablet, Take 1 tablet (500 mg total) by mouth 2 (two) times a day with meals, Disp: 20 tablet, Rfl: 0    ondansetron (ZOFRAN) 4 mg tablet, Take 1 tablet (4 mg total) by mouth every 8 (eight) hours as needed for nausea or vomiting, Disp: 20 tablet, Rfl: 0    pantoprazole (PROTONIX) 40 mg tablet, Take 1 tablet (40 mg total) by mouth daily, Disp: 14 tablet, Rfl: 0    senna-docusate sodium (SENOKOT S) 8.6-50 mg per tablet, Take 2 tablets by mouth daily, Disp: , Rfl:   No current facility-administered medications for this visit.    Current Allergies     Allergies as of 04/15/2025    (No Known Allergies)            The following portions of the patient's history were reviewed and updated as appropriate: allergies, current medications, past family history, past medical history, past social history, past surgical history and problem list.     Past Medical History:   Diagnosis Date     "Asthma     Closed displaced fracture of base of fifth metacarpal bone of right hand with routine healing 9/19/2018    Otitis media     Shoulder pain     left    Sinus infection        Past Surgical History:   Procedure Laterality Date    CIRCUMCISION      [place] SURGICAL ARTHROSCOPY SHOULDER CAPSULORRHAPHY Left 5/9/2016    Procedure: ARTHROSCOPY SHOULDER , ANTERIOR LABRAL REPAIR ;  Surgeon: Luis Miguel Sprague MD;  Location: [facility] OR;  Service: Orthopedics       Family History   Problem Relation Age of Onset    Colon polyps Mother     Hypothyroidism Mother     ADD / ADHD Sister     Anemia Maternal Grandmother     Asthma Maternal Grandmother     Hypothyroidism Maternal Grandmother     Cirrhosis Maternal Grandfather     Hepatitis Maternal Grandfather     Liver disease Maternal Grandfather     Breast cancer Paternal Grandmother     Inflammatory bowel disease Maternal Aunt     ADD / ADHD Maternal Uncle     Asthma Maternal Uncle     Hypertension Maternal Uncle     No Known Problems Father          Medications have been verified.        Objective   /72 (BP Location: Left arm, Patient Position: Sitting, Cuff Size: Large)   Pulse 79   Temp 99 °F (37.2 °C) (Tympanic)   Resp 18   Ht 5' 8\" (1.727 m)   Wt 108 kg (237 lb 3.2 oz)   SpO2 99%   BMI 36.07 kg/m²   No LMP for male patient.       Physical Exam     Physical Exam  Vitals reviewed.   Constitutional:       General: He is not in acute distress.     Appearance: Normal appearance. He is not ill-appearing.   HENT:      Head: Normocephalic and atraumatic.      Right Ear: Tympanic membrane, ear canal and external ear normal.      Left Ear: Tympanic membrane, ear canal and external ear normal.      Nose: Nose normal.      Mouth/Throat:      Mouth: Mucous membranes are moist.      Pharynx: Oropharynx is clear. No posterior oropharyngeal erythema.        Comments: Left upper wisdom tooth is erupting from the gumline.  No redness or swelling noted around.  Area is tender to " touch  Eyes:      Extraocular Movements: Extraocular movements intact.      Conjunctiva/sclera: Conjunctivae normal.      Pupils: Pupils are equal, round, and reactive to light.   Cardiovascular:      Rate and Rhythm: Normal rate and regular rhythm.      Pulses: Normal pulses.      Heart sounds: Normal heart sounds.   Pulmonary:      Effort: Pulmonary effort is normal.      Breath sounds: Normal breath sounds.   Abdominal:      General: Abdomen is flat. Bowel sounds are normal.   Musculoskeletal:      Cervical back: Normal range of motion.   Lymphadenopathy:      Cervical: No cervical adenopathy.   Skin:     General: Skin is warm.   Neurological:      General: No focal deficit present.      Mental Status: He is alert.   Psychiatric:         Mood and Affect: Mood normal.         Behavior: Behavior normal.         Judgment: Judgment normal.

## 2025-05-28 ENCOUNTER — TELEPHONE (OUTPATIENT)
Age: 25
End: 2025-05-28
